# Patient Record
Sex: MALE | Race: WHITE | Employment: FULL TIME | URBAN - METROPOLITAN AREA
[De-identification: names, ages, dates, MRNs, and addresses within clinical notes are randomized per-mention and may not be internally consistent; named-entity substitution may affect disease eponyms.]

---

## 2017-09-20 ENCOUNTER — OFFICE VISIT (OUTPATIENT)
Dept: NEUROLOGY | Age: 57
End: 2017-09-20
Payer: COMMERCIAL

## 2017-09-20 VITALS
WEIGHT: 167.4 LBS | DIASTOLIC BLOOD PRESSURE: 94 MMHG | HEIGHT: 67 IN | BODY MASS INDEX: 26.27 KG/M2 | HEART RATE: 84 BPM | SYSTOLIC BLOOD PRESSURE: 135 MMHG

## 2017-09-20 DIAGNOSIS — G47.33 OBSTRUCTIVE SLEEP APNEA SYNDROME: ICD-10-CM

## 2017-09-20 DIAGNOSIS — R41.3 MEMORY LOSS: Primary | ICD-10-CM

## 2017-09-20 PROCEDURE — 99204 OFFICE O/P NEW MOD 45 MIN: CPT | Performed by: PSYCHIATRY & NEUROLOGY

## 2017-09-20 RX ORDER — SIMVASTATIN 40 MG
1 TABLET ORAL DAILY
COMMUNITY
Start: 2017-08-01

## 2017-09-20 RX ORDER — LISINOPRIL 20 MG/1
1 TABLET ORAL DAILY
COMMUNITY
Start: 2017-07-29

## 2017-09-20 RX ORDER — MODAFINIL 200 MG/1
200 TABLET ORAL DAILY
Qty: 30 TABLET | Refills: 3 | Status: SHIPPED | OUTPATIENT
Start: 2017-09-20 | End: 2018-02-13 | Stop reason: SDUPTHER

## 2017-09-20 RX ORDER — TOLTERODINE 4 MG/1
1 CAPSULE, EXTENDED RELEASE ORAL DAILY
COMMUNITY
Start: 2017-02-02 | End: 2020-10-06

## 2017-09-21 ENCOUNTER — TELEPHONE (OUTPATIENT)
Dept: NEUROLOGY | Age: 57
End: 2017-09-21

## 2017-12-26 ENCOUNTER — HOSPITAL ENCOUNTER (OUTPATIENT)
Dept: MRI IMAGING | Age: 57
Discharge: HOME | End: 2017-12-26
Payer: COMMERCIAL

## 2017-12-26 ENCOUNTER — HOSPITAL ENCOUNTER (OUTPATIENT)
Dept: NEUROLOGY | Age: 57
Discharge: HOME | End: 2017-12-26
Payer: COMMERCIAL

## 2017-12-26 ENCOUNTER — HOSPITAL ENCOUNTER (OUTPATIENT)
Age: 57
Discharge: HOME | End: 2017-12-26
Payer: COMMERCIAL

## 2017-12-26 DIAGNOSIS — R41.3 MEMORY LOSS: ICD-10-CM

## 2017-12-26 LAB
FOLATE: 12.2 NG/ML
TSH SERPL DL<=0.05 MIU/L-ACNC: 1.95 MIU/L (ref 0.3–5)
VITAMIN B-12: 263 PG/ML (ref 232–1245)

## 2017-12-26 PROCEDURE — 70551 MRI BRAIN STEM W/O DYE: CPT

## 2017-12-26 PROCEDURE — 82746 ASSAY OF FOLIC ACID SERUM: CPT

## 2017-12-26 PROCEDURE — 84443 ASSAY THYROID STIM HORMONE: CPT

## 2017-12-26 PROCEDURE — 95816 EEG AWAKE AND DROWSY: CPT

## 2017-12-26 PROCEDURE — 86038 ANTINUCLEAR ANTIBODIES: CPT

## 2017-12-26 PROCEDURE — 82607 VITAMIN B-12: CPT

## 2017-12-26 PROCEDURE — 36415 COLL VENOUS BLD VENIPUNCTURE: CPT

## 2017-12-27 LAB — ANTI-NUCLEAR ANTIBODY (ANA): NEGATIVE

## 2018-02-13 RX ORDER — MODAFINIL 200 MG/1
200 TABLET ORAL DAILY
Qty: 90 TABLET | Refills: 0 | Status: SHIPPED | OUTPATIENT
Start: 2018-02-13 | End: 2018-05-14

## 2018-05-10 ENCOUNTER — OFFICE VISIT (OUTPATIENT)
Dept: NEUROLOGY | Age: 58
End: 2018-05-10
Payer: COMMERCIAL

## 2018-05-10 VITALS
HEIGHT: 67 IN | SYSTOLIC BLOOD PRESSURE: 127 MMHG | HEART RATE: 78 BPM | WEIGHT: 157.8 LBS | BODY MASS INDEX: 24.77 KG/M2 | DIASTOLIC BLOOD PRESSURE: 92 MMHG

## 2018-05-10 DIAGNOSIS — G47.33 OBSTRUCTIVE SLEEP APNEA SYNDROME: Primary | ICD-10-CM

## 2018-05-10 PROCEDURE — 99214 OFFICE O/P EST MOD 30 MIN: CPT | Performed by: PSYCHIATRY & NEUROLOGY

## 2018-05-10 RX ORDER — MODAFINIL 200 MG/1
TABLET ORAL
Qty: 90 TABLET | Refills: 0 | Status: SHIPPED | OUTPATIENT
Start: 2018-05-10 | End: 2018-08-08 | Stop reason: SDUPTHER

## 2018-05-10 RX ORDER — MODAFINIL 200 MG/1
200 TABLET ORAL DAILY
Qty: 30 TABLET | Refills: 0 | Status: SHIPPED | OUTPATIENT
Start: 2018-05-10 | End: 2018-06-09

## 2018-05-10 ASSESSMENT — ENCOUNTER SYMPTOMS
RESPIRATORY NEGATIVE: 1
GASTROINTESTINAL NEGATIVE: 1
EYES NEGATIVE: 1
ALLERGIC/IMMUNOLOGIC NEGATIVE: 1

## 2018-07-27 ENCOUNTER — TELEPHONE (OUTPATIENT)
Dept: NEUROLOGY | Age: 58
End: 2018-07-27

## 2018-07-31 NOTE — TELEPHONE ENCOUNTER
I called and spoke with Mrs. Berman. I was not in office Friday or yesterday. She said he doesn't think he is having difficulties but she doesn't like the fact he isn't sleeping well and he has had weight loss, but he does have recurrence of prostate cancer. I lurdes dher would be best for Dr Wisam Card to see him. She also added he is doing some\"funny things\" with his mouth. Put in for an appt on 8/8/18 at 8:20. She will discuss with him.

## 2018-08-07 DIAGNOSIS — G47.33 OBSTRUCTIVE SLEEP APNEA SYNDROME: Primary | ICD-10-CM

## 2018-08-07 RX ORDER — MODAFINIL 200 MG/1
TABLET ORAL
Qty: 30 TABLET | Refills: 0 | Status: CANCELLED | OUTPATIENT
Start: 2018-08-07 | End: 2018-11-07

## 2018-08-07 NOTE — TELEPHONE ENCOUNTER
Dylan Lambert called in today. He said his wife was \"cleaning out medications\" and threw out his modafanil. He doesn't have any. He would like a 30 day supply to [facility] in [place] and a 90d supply to his [facility]. He was here in [facility]. The last contact was from his wife wo said that he had recurrence of prostate cancer and 20 lb. weight loss. She felt the Modafanil was effecting hiim so she booked him an appt for 8/8/18. He would like to cancel this. He doesn't feel the Tanvir Michi is a problem and his wife said that in the call on Friday. Please advise.

## 2018-08-07 NOTE — TELEPHONE ENCOUNTER
Discussed with Dr. Prasanna Shafer. See other telephone encounter for today from Glory Alvarez. He wants to see him but we can give a 30d supply. I spoke with MR. Berman. He will keep tomorrows appt but needs refill tonight if possible at [facility] in [place].

## 2018-08-08 ENCOUNTER — OFFICE VISIT (OUTPATIENT)
Dept: NEUROLOGY | Age: 58
End: 2018-08-08
Payer: COMMERCIAL

## 2018-08-08 VITALS
HEIGHT: 67 IN | DIASTOLIC BLOOD PRESSURE: 87 MMHG | HEART RATE: 54 BPM | SYSTOLIC BLOOD PRESSURE: 132 MMHG | WEIGHT: 149.2 LBS | BODY MASS INDEX: 23.42 KG/M2

## 2018-08-08 DIAGNOSIS — G47.33 OBSTRUCTIVE SLEEP APNEA SYNDROME: Primary | ICD-10-CM

## 2018-08-08 DIAGNOSIS — R41.3 MEMORY LOSS: ICD-10-CM

## 2018-08-08 PROCEDURE — 99214 OFFICE O/P EST MOD 30 MIN: CPT | Performed by: PSYCHIATRY & NEUROLOGY

## 2018-08-08 RX ORDER — MODAFINIL 200 MG/1
TABLET ORAL
Qty: 14 TABLET | Refills: 0 | Status: SHIPPED | OUTPATIENT
Start: 2018-08-08 | End: 2018-08-22

## 2018-08-08 RX ORDER — MODAFINIL 200 MG/1
TABLET ORAL
Qty: 90 TABLET | Refills: 0 | Status: SHIPPED | OUTPATIENT
Start: 2018-08-08 | End: 2018-10-08 | Stop reason: SDUPTHER

## 2018-08-08 ASSESSMENT — ENCOUNTER SYMPTOMS
RESPIRATORY NEGATIVE: 1
EYES NEGATIVE: 1
ALLERGIC/IMMUNOLOGIC NEGATIVE: 1
GASTROINTESTINAL NEGATIVE: 1

## 2018-08-08 NOTE — PROGRESS NOTES
Subjective:      Patient ID: Kaushal Hare is a 59 y.o. male. HPI    Active problem memory loss and sleep apnea unable to tolerate nasal CPAP having had UPPP with tongue reduction through radiofrequency trying to get approval for hypoglossal nerve stimulator  . Feel memory and cognitive complaints are attributed to sleep apnea with nonrestorative sleep and excessive daytime sleepiness on provigil . The condition is he has had approval to undergo hypoglossal nerve stimulator to have this surgery in November at  of   . He has prostate cancer having had prostatectomy and irradiation in 2015 and 2016 . He has had gallium scan that shows enlarged hip area lymph nodes on both sides which they think is from prostate cancer to undergo irradiation to this area . He has lost weight weighing 149 lbs with last weight being 157 lbs in May with concern this maybe from provigil  . He is going to bed 10 PM to 12 AM falling asleep within a few minutes . He will sleep to 5:30 AM to 6 AM . There will be frequent arousals every hour with gasping and frequent apnea. Snoring has been attenuated with UPPP to wheezing like noise. He will take provigil in morning from 6 to 7 AM staying more focused being awake . This will wear off by about 5 to 6 PM with fatigue . Memory is good on provigil able to focus being more functional . Significant medications provigil 200 mg po qAM . Testing B12 263, LIV negative, TSH normal , folic acid 50.0 .  MRI of Head normal ., EEG normal      Past Medical History:   Diagnosis Date    ADD (attention deficit disorder)     Anemia     Caffeine use     Does not use    Depression     ED (erectile dysfunction)     Hyperlipidemia     Hypertension     Mitral valve disease     [facility] [address]     Sleep apnea        Past Surgical History:   Procedure Laterality Date    APPENDECTOMY      BICEPS TENDON REPAIR      KNEE SURGERY      OTHER SURGICAL HISTORY      Semnoplasty    PROSTATECTOMY     

## 2018-08-08 NOTE — LETTER
Frequent urination       Review of Systems   Constitutional: Positive for appetite change and unexpected weight change. HENT: Negative. Eyes: Negative. Respiratory: Negative. Cardiovascular: Negative. Gastrointestinal: Negative. Genitourinary: Positive for frequency and urgency. Musculoskeletal: Negative. Skin: Negative. Allergic/Immunologic: Negative. Neurological: Negative. Psychiatric/Behavioral: Negative. Objective:   Physical Exam    Vitals:    08/08/18 0836   BP: 132/87   Pulse: 54     Constitutional .General appearance in no acute distress    Ears/Nose/Throat. Normal soft palate and uvula. Base of tongue normal          Neck normal  Respiratory . Breath sounds clear bilaterally  Cardiovascular. Regular rate and rhythm and normal heart sounds  Muskuloskeletal.Normal tone. Muscle strength grossly normal nonfocal.   Gait and station normal  Orientation and mood. Alert and oriented. WORLD able to spell forwards and back wards . [address] . Short term memory 3 words out of 3 in one minute   Attention and concentration normal   Language and speech. Normal quality with no aphasia  Cranial neve 2. Fields intact to confrontation  Cranial nerve 3,4 and 6. Extraocular movements intact. Pupils equal round and reactive to light  Cranial Nerve 7. Normal exam  Sensation . Intact to pin prick  Deep tendon reflexes intact     Assessment:      1. Obstructive sleep apnea syndrome    2. Memory loss    Will keep watch of his weight keeping on provigil to proceed with hypoglossal nerve stimulator      Plan:      As above           If you have questions, please do not hesitate to call me. I look forward to following Jean Marshall along with you.     Sincerely,        Marily Reyna MD     providers:  Les Watkins MD  [address]: [phone]

## 2018-08-09 NOTE — COMMUNICATION BODY
Subjective:      Patient ID: Grafton Shone is a 59 y.o. male. HPI    Active problem memory loss and sleep apnea unable to tolerate nasal CPAP having had UPPP with tongue reduction through radiofrequency trying to get approval for hypoglossal nerve stimulator  . Feel memory and cognitive complaints are attributed to sleep apnea with nonrestorative sleep and excessive daytime sleepiness on provigil . The condition is he has had approval to undergo hypoglossal nerve stimulator [facility] He has prostate cancer having had prostatectomy and irradiation in 2015 and 2016 . He has had gallium scan that shows enlarged hip area lymph nodes on both sides which they think is from prostate cancer to undergo irradiation to this area . He has lost weight weighing 149 lbs with last weight being 157 lbs in May with concern this maybe from provigil  . He is going to bed 10 PM to 12 AM falling asleep within a few minutes . He will sleep to 5:30 AM to 6 AM . There will be frequent arousals every hour with gasping and frequent apnea. Snoring has been attenuated with UPPP to wheezing like noise. He will take provigil in morning from 6 to 7 AM staying more focused being awake . This will wear off by about 5 to 6 PM with fatigue . Memory is good on provigil able to focus being more functional . Significant medications provigil 200 mg po qAM . Testing B12 263, LIV negative, TSH normal , folic acid 10.7 .  MRI of Head normal ., EEG normal      Past Medical History:   Diagnosis Date    ADD (attention deficit disorder)     Anemia     Caffeine use     Does not use    Depression     ED (erectile dysfunction)     Hyperlipidemia     Hypertension     Mitral valve disease     Pro[facility] ([facility] [address]     Sleep apnea        Past Surgical History:   Procedure Laterality Date    APPENDECTOMY      BICEPS TENDON REPAIR      KNEE SURGERY      OTHER SURGICAL HISTORY      Semnoplasty    PROSTATECTOMY      Exam    Vitals:    08/08/18 0836   BP: 132/87   Pulse: 54     Constitutional .General appearance in no acute distress    Ears/Nose/Throat. Normal soft palate and uvula. Base of tongue normal          Neck normal  Respiratory . Breath sounds clear bilaterally  Cardiovascular. Regular rate and rhythm and normal heart sounds  Muskuloskeletal.Normal tone. Muscle strength grossly normal nonfocal.   Gait and station normal  Orientation and mood. Alert and oriented. WORLD able to spell forwards and back wards . Serila 7 . Short term memory 3 words out of 3 in one minute   Attention and concentration normal   Language and speech. Normal quality with no aphasia  Cranial neve 2. Fields intact to confrontation  Cranial nerve 3,4 and 6. Extraocular movements intact. Pupils equal round and reactive to light  Cranial Nerve 7. Normal exam  Sensation . Intact to pin prick  Deep tendon reflexes intact     Assessment:      1. Obstructive sleep apnea syndrome    2.  Memory loss    Will keep watch of his weight keeping on provigil to proceed with hypoglossal nerve stimulator      Plan:      As above

## 2018-10-08 ENCOUNTER — OFFICE VISIT (OUTPATIENT)
Dept: NEUROLOGY | Age: 58
End: 2018-10-08
Payer: COMMERCIAL

## 2018-10-08 VITALS
WEIGHT: 152 LBS | BODY MASS INDEX: 23.86 KG/M2 | HEIGHT: 67 IN | DIASTOLIC BLOOD PRESSURE: 94 MMHG | HEART RATE: 67 BPM | SYSTOLIC BLOOD PRESSURE: 135 MMHG

## 2018-10-08 DIAGNOSIS — G47.33 OBSTRUCTIVE SLEEP APNEA SYNDROME: ICD-10-CM

## 2018-10-08 PROCEDURE — 99214 OFFICE O/P EST MOD 30 MIN: CPT | Performed by: PSYCHIATRY & NEUROLOGY

## 2018-10-08 RX ORDER — MODAFINIL 200 MG/1
TABLET ORAL
Qty: 30 TABLET | Refills: 0 | Status: SHIPPED | OUTPATIENT
Start: 2018-10-08 | End: 2019-11-08

## 2018-10-08 ASSESSMENT — ENCOUNTER SYMPTOMS
GASTROINTESTINAL NEGATIVE: 1
RESPIRATORY NEGATIVE: 1
ALLERGIC/IMMUNOLOGIC NEGATIVE: 1
EYES NEGATIVE: 1

## 2018-10-08 NOTE — LETTER
 ED (erectile dysfunction)     Hyperlipidemia     Hypertension     Mitral valve disease     Prostate cancer ([facility] [address]     Sleep apnea        Past Surgical History:   Procedure Laterality Date    APPENDECTOMY      BICEPS TENDON REPAIR      KNEE SURGERY      OTHER SURGICAL HISTORY      Semnoplasty    PROSTATECTOMY      TONSILLECTOMY AND ADENOIDECTOMY      UVULOPALATOPHARYGOPLASTY         History reviewed. No pertinent family history. Social History     Social History    Marital status:      Spouse name: Silas Davis    Number of children: 2    Years of education: N/A     Occupational History    Technician @ [employer]      Social History Main Topics    Smoking status: Never Smoker    Smokeless tobacco: Never Used    Alcohol use Yes      Comment: occ    Drug use: No    Sexual activity: Yes     Other Topics Concern    None     Social History Narrative    None       Current Outpatient Prescriptions   Medication Sig Dispense Refill    modafinil (PROVIGIL) 200 MG tablet Take 1 po qd. 30 tablet 0    lisinopril (PRINIVIL;ZESTRIL) 20 MG tablet Take 1 tablet by mouth daily      simvastatin (ZOCOR) 40 MG tablet Take 1 tablet by mouth daily      tolterodine (DETROL LA) 4 MG extended release capsule Take 1 capsule by mouth daily      Cyanocobalamin (VITAMIN B-12 IJ) Inject 1,000 mcg into the skin      Multiple Vitamins-Minerals (THERAPEUTIC MULTIVITAMIN-MINERALS) tablet Take 1 tablet by mouth daily      sildenafil (VIAGRA) 100 MG tablet Take 100 mg by mouth as needed        No current facility-administered medications for this visit. Allergies   Allergen Reactions    Atomoxetine Other (See Comments)     Frequent urination    Strattera [Atomoxetine Hcl] Other (See Comments)     Frequent urination       Review of Systems   Constitutional: Positive for unexpected weight change. HENT: Negative. Eyes: Negative. Respiratory: Negative. Cardiovascular: Negative.

## 2018-10-09 NOTE — COMMUNICATION BODY
Neck normal  Respiratory . Breath sounds clear bilaterally  Cardiovascular. Regular rate and rhythm and normal heart sounds  Muskuloskeletal.Normal tone. Muscle strength grossly normal nonfocal.   Gait and station normal  Orientation and mood. Alert and oriented. WORLD able to spell forwards and back wards . Serila 7 . Short term memory 3 words out of 3 in one minute   Attention and concentration normal   Language and speech. Normal quality with no aphasia  Cranial neve 2. Fields intact to confrontation  Cranial nerve 3,4 and 6. Extraocular movements intact. Pupils equal round and reactive to light  Cranial Nerve 7. Normal exam  Sensation . Intact to pin prick  Deep tendon reflexes intact     Assessment:      1.  Obstructive sleep apnea syndrome    Will dispense provigil only locally for mailorder is inconsistent to proceed with hypoglossal nerve stimulator      Plan:      As above

## 2018-11-17 ENCOUNTER — HOSPITAL ENCOUNTER (OUTPATIENT)
Age: 58
Discharge: HOME | End: 2018-11-17
Payer: COMMERCIAL

## 2018-11-17 PROCEDURE — 87081 CULTURE SCREEN ONLY: CPT

## 2018-11-17 PROCEDURE — 87641 MR-STAPH DNA AMP PROBE: CPT

## 2018-11-18 LAB
CULTURE: NORMAL
Lab: NORMAL
MRSA, DNA, NASAL: NORMAL
SPECIMEN DESCRIPTION: NORMAL
SPECIMEN DESCRIPTION: NORMAL
STATUS: NORMAL

## 2020-10-06 ENCOUNTER — HOSPITAL ENCOUNTER (OUTPATIENT)
Dept: PREADMISSION TESTING | Age: 60
Discharge: HOME | End: 2020-10-10
Payer: COMMERCIAL

## 2020-10-06 VITALS
DIASTOLIC BLOOD PRESSURE: 93 MMHG | RESPIRATION RATE: 16 BRPM | SYSTOLIC BLOOD PRESSURE: 140 MMHG | TEMPERATURE: 97.5 F | BODY MASS INDEX: 25.19 KG/M2 | WEIGHT: 160.5 LBS | OXYGEN SATURATION: 98 % | HEART RATE: 96 BPM | HEIGHT: 67 IN

## 2020-10-06 LAB
ABSOLUTE EOS #: 0.09 K/UL (ref 0–0.44)
ABSOLUTE IMMATURE GRANULOCYTE: 0.06 K/UL (ref 0–0.3)
ABSOLUTE LYMPH #: 0.84 K/UL (ref 1.1–3.7)
ABSOLUTE MONO #: 0.56 K/UL (ref 0.1–1.2)
ANION GAP SERPL CALCULATED.3IONS-SCNC: 10 MMOL/L (ref 9–17)
BASOPHILS # BLD: 1 % (ref 0–2)
BASOPHILS ABSOLUTE: 0.06 K/UL (ref 0–0.2)
BUN BLDV-MCNC: 18 MG/DL (ref 8–23)
CHLORIDE BLD-SCNC: 102 MMOL/L (ref 98–107)
CO2: 25 MMOL/L (ref 20–31)
CREAT SERPL-MCNC: 0.73 MG/DL (ref 0.7–1.2)
DIFFERENTIAL TYPE: ABNORMAL
EOSINOPHILS RELATIVE PERCENT: 1 % (ref 1–4)
GFR AFRICAN AMERICAN: >60 ML/MIN
GFR NON-AFRICAN AMERICAN: >60 ML/MIN
GFR SERPL CREATININE-BSD FRML MDRD: NORMAL ML/MIN/{1.73_M2}
GFR SERPL CREATININE-BSD FRML MDRD: NORMAL ML/MIN/{1.73_M2}
HCT VFR BLD CALC: 43 % (ref 40.7–50.3)
HEMOGLOBIN: 14.7 G/DL (ref 13–17)
IMMATURE GRANULOCYTES: 1 %
LYMPHOCYTES # BLD: 12 % (ref 24–43)
MCH RBC QN AUTO: 33.5 PG (ref 25.2–33.5)
MCHC RBC AUTO-ENTMCNC: 34.2 G/DL (ref 28.4–34.8)
MCV RBC AUTO: 97.9 FL (ref 82.6–102.9)
MONOCYTES # BLD: 8 % (ref 3–12)
NRBC AUTOMATED: 0 PER 100 WBC
PDW BLD-RTO: 11.8 % (ref 11.8–14.4)
PLATELET # BLD: 231 K/UL (ref 138–453)
PLATELET ESTIMATE: ABNORMAL
PMV BLD AUTO: 9 FL (ref 8.1–13.5)
POTASSIUM SERPL-SCNC: 4.1 MMOL/L (ref 3.7–5.3)
RBC # BLD: 4.39 M/UL (ref 4.21–5.77)
RBC # BLD: ABNORMAL 10*6/UL
SEG NEUTROPHILS: 77 % (ref 36–65)
SEGMENTED NEUTROPHILS ABSOLUTE COUNT: 5.16 K/UL (ref 1.5–8.1)
SODIUM BLD-SCNC: 137 MMOL/L (ref 135–144)
WBC # BLD: 6.8 K/UL (ref 3.5–11.3)
WBC # BLD: ABNORMAL 10*3/UL

## 2020-10-06 PROCEDURE — 36415 COLL VENOUS BLD VENIPUNCTURE: CPT

## 2020-10-06 PROCEDURE — 82565 ASSAY OF CREATININE: CPT

## 2020-10-06 PROCEDURE — 85025 COMPLETE CBC W/AUTO DIFF WBC: CPT

## 2020-10-06 PROCEDURE — 84520 ASSAY OF UREA NITROGEN: CPT

## 2020-10-06 PROCEDURE — 80051 ELECTROLYTE PANEL: CPT

## 2020-10-06 PROCEDURE — 93005 ELECTROCARDIOGRAM TRACING: CPT | Performed by: ANESTHESIOLOGY

## 2020-10-06 RX ORDER — BUPROPION HYDROCHLORIDE 100 MG/1
200 TABLET ORAL 2 TIMES DAILY
COMMUNITY

## 2020-10-06 RX ORDER — OMEPRAZOLE 20 MG/1
40 CAPSULE, DELAYED RELEASE ORAL DAILY
COMMUNITY

## 2020-10-06 RX ORDER — PREGABALIN 100 MG/1
100 CAPSULE ORAL 2 TIMES DAILY
COMMUNITY

## 2020-10-06 RX ORDER — BUPROPION HYDROCHLORIDE 100 MG/1
100 TABLET, EXTENDED RELEASE ORAL 2 TIMES DAILY
COMMUNITY
End: 2020-10-06

## 2020-10-06 NOTE — H&P
History and Physical Service   [facility]    HISTORY AND PHYSICAL EXAMINATION            Date of Evaluation: 10/6/2020  Patient name:  Thomas Young  MRN:   [id number]  YOB: 1960  PCP:    Taty Hanna    History Obtained From:     Patient, Medical records    History of Present Illness: This is Thomas Young a 56 y.o. male who presents for a pre-admission testing appointment for an upcoming right foot Lapidus bunionectomy by Dr. Jax Sosa scheduled on 10/20/2020 at 0730 due to right foot hallux valgus. The patient's chief complaint is 6-7/10 right foot pain while wearing certain shoes. Pt denies taking pain medication for the foot pain. Pt denies right foot edema, numbness, and tingling. History of mitral valve disease, hyperlipidemia, hypertension, sleep apnea, and difficult intubation (Pt states a glidescope has been used for intubation in the past). Pt states he had difficulty breathing when he was extubated after knee arthroscopy. S/p hypoglossal nerve stimulator placement in 2018 and revision in 06/2020. He had difficulty breathing after the revision and was admitted overnight for monitoring. The revision did not improve the function of the stimulator and it was turned off. Dr. Nkechi Yarbrough from the [address] [place] [address],[address] put in the \"[employer]\" hypoglossal nerve stimulator. His next appointment with Dr. Nkechi Yarbrough is on 10/28/2020. Discussed the pt's medical history with Dr. Sydney Amezcua. Dr. Sydney Amezcua stated the pt does not need to be evaluated by anesthesia during the PAT appointment. The pt will be evaluated by anesthesia the morning of surgery per Dr. Sydney Amezcua. Functional Capacity per pt:   1) Pt is able to walk 2 city blocks on level ground without SOB. 2) Pt is able to climb 2 flights of stairs without SOB. 3) Pt is able to walk up a hill for 1-2 city blocks without SOB.     Past Medical History:     Past Medical History:   Diagnosis Date    ADD (attention deficit disorder)     Anemia     Anesthesia complication Prior to 2928    The pt had anxiety and trouble breathing when extubated after knee arthroscopy.  Caffeine use     Does not use    Depression     ED (erectile dysfunction)     History of difficult intubation     Pt states a glidescope was needed in the past for intubation.  Hyperlipidemia     Hypertension     Mitral valve disease     PONV (postoperative nausea and vomiting)     Prostate cancer ([facility] [address]     Radiation cystitis     Sleep apnea     semnoplasty        Past Surgical History:     Past Surgical History:   Procedure Laterality Date    APPENDECTOMY      BICEPS TENDON REPAIR      BLADDER SURGERY      stricture,biopsy,cauterized    JOINT REPLACEMENT Bilateral     total joint    KNEE SURGERY      OTHER SURGICAL HISTORY  12/05/2018    hypoglossal nerve stimulator insertion     OTHER SURGICAL HISTORY  06/12/2020    revision of hypoglossal nerve stimulator    PROSTATECTOMY      TONSILLECTOMY AND ADENOIDECTOMY      UVULOPALATOPHARYGOPLASTY  2004 or 2005    with somnoplasty        Medications Prior to Admission:     Prior to Admission medications    Medication Sig Start Date End Date Taking? Authorizing Provider   pregabalin (LYRICA) 100 MG capsule Take 100 mg by mouth 2 times daily.    Yes Historical Provider, MD   omeprazole (PRILOSEC) 20 MG delayed release capsule Take 40 mg by mouth daily   Yes Historical Provider, MD   mirabegron (MYRBETRIQ) 25 MG TB24 Take 25 mg by mouth 2 times daily   Yes Historical Provider, MD   buPROPion (WELLBUTRIN) 100 MG tablet Take 200 mg by mouth 2 times daily   Yes Historical Provider, MD   lisinopril (PRINIVIL;ZESTRIL) 20 MG tablet Take 1 tablet by mouth daily 7/29/17   Historical Provider, MD   simvastatin (ZOCOR) 40 MG tablet Take 1 tablet by mouth daily 8/1/17   Historical Provider, MD   Cyanocobalamin (VITAMIN B-12 IJ) Inject 1,000 mcg into the skin daily     Historical Provider, MD   Multiple lightheadedness. Pt denies history of a stroke, seizure, and head injury. BEHAVIOR/PSYCH: History of ADD. Negative for depression and anxiety. Physical Exam:   BP (!) 140/93   Pulse 96   Temp 97.5 °F (36.4 °C) (Temporal)   Resp 16   Ht 5' 7\" (1.702 m)   Wt 160 lb 7.9 oz (72.8 kg)   SpO2 98%   BMI 25.14 kg/m²  No results for input(s): POCGLU in the last 72 hours. General Appearance:  Alert, well appearing, and in no acute distress. Mental status: Oriented to person, place, and time. Head: Normocephalic and atraumatic. Eye: No icterus, redness, pupils equal and reactive, extraocular eye movements intact, and conjunctiva clear. Ear: Hearing grossly intact. Nose: No drainage noted. Mouth: Mucous membranes moist.  Neck: Supple and no carotid bruits noted. Lungs: Bilateral equal air entry, clear to auscultation, no wheezing, rales or rhonchi, and normal effort. Cardiovascular: Hypoglossal nerve stimulator generator in the right upper chest. Normal rate, regular rhythm, no murmur, gallop, or rub. Abdomen: Soft, non-tender, non-distended, and active bowel sounds. Neurologic: Normal speech and cranial nerves II through XII grossly intact. Strength 5/5 bilaterally. Skin: No gross lesions, rashes, bruising, or bleeding on exposed skin area. Extremities: Posterior tibial pulses 2+ bilaterally. No ankle edema. No calf tenderness with palpation. Psych: Normal affect.      Investigations:      Laboratory Testing:  Recent Results (from the past 24 hour(s))   CBC Auto Differential    Collection Time: 10/06/20  3:51 PM   Result Value Ref Range    WBC 6.8 3.5 - 11.3 k/uL    RBC 4.39 4.21 - 5.77 m/uL    Hemoglobin 14.7 13.0 - 17.0 g/dL    Hematocrit 43.0 40.7 - 50.3 %    MCV 97.9 82.6 - 102.9 fL    MCH 33.5 25.2 - 33.5 pg    MCHC 34.2 28.4 - 34.8 g/dL    RDW 11.8 11.8 - 14.4 %    Platelets 939 867 - 728 k/uL    MPV 9.0 8.1 - 13.5 fL    NRBC Automated 0.0 0.0 per 100 WBC    Differential Type NOT REPORTED Seg Neutrophils 77 (H) 36 - 65 %    Lymphocytes 12 (L) 24 - 43 %    Monocytes 8 3 - 12 %    Eosinophils % 1 1 - 4 %    Basophils 1 0 - 2 %    Immature Granulocytes 1 (H) 0 %    Segs Absolute 5.16 1.50 - 8.10 k/uL    Absolute Lymph # 0.84 (L) 1.10 - 3.70 k/uL    Absolute Mono # 0.56 0.10 - 1.20 k/uL    Absolute Eos # 0.09 0.00 - 0.44 k/uL    Basophils Absolute 0.06 0.00 - 0.20 k/uL    Absolute Immature Granulocyte 0.06 0.00 - 0.30 k/uL    WBC Morphology NOT REPORTED     RBC Morphology NOT REPORTED     Platelet Estimate NOT REPORTED    BUN & Creatinine    Collection Time: 10/06/20  3:51 PM   Result Value Ref Range    BUN 18 8 - 23 mg/dL    CREATININE 0.73 0.70 - 1.20 mg/dL    GFR Non-African American >60 >60 mL/min    GFR African American >60 >60 mL/min    GFR Comment          GFR Staging NOT REPORTED    Electrolyte Panel    Collection Time: 10/06/20  3:51 PM   Result Value Ref Range    Sodium 137 135 - 144 mmol/L    Potassium 4.1 3.7 - 5.3 mmol/L    Chloride 102 98 - 107 mmol/L    CO2 25 20 - 31 mmol/L    Anion Gap 10 9 - 17 mmol/L       Recent Labs     10/06/20  1551   HGB 14.7   HCT 43.0   WBC 6.8   MCV 97.9      K 4.1      CO2 25   BUN 18   CREATININE 0.73       No results for input(s): COVID19 in the last 720 hours. EKG: 10/06/2020. See Epic. Diagnosis:      1. Right foot hallux valgus    Plans:     1.  Right foot Lapidus bunionectomy      Glenis Fraser, MIKAYLA - CNP  10/6/2020  5:11 PM

## 2020-10-06 NOTE — PRE-PROCEDURE INSTRUCTIONS
[facility] ON Tuesday, October 20th at 06:00 AM    covid screening 10/16/2020 at 10:20am    Day of surgery call [phone] and they will instruct on what to do      Continue to take your home medications as you normally do up to and including the night before surgery with the exception of any blood thinning medications. Please stop any blood thinning medications as directed by your surgeon or prescribing physician. Failure to stop certain medications may interfere with your scheduled surgery. These may include:  Aspirin, Warfarin (Coumadin), Clopidogrel (Plavix), Ibuprofen (Motrin, Advil), Naproxen (Aleve), Meloxicam (Mobic), Celecoxib (Celebrex), Eliquis, Pradaxa, Xarelto, Effient, Fish Oil, Herbal supplements. Stop multivitamin one week prior to surgery, tylenol is okay to take    If you are diabetic, do not take any of your diabetic medications by mouth the morning of surgery. If you are taking insulin contact the doctor that manages your diabetes for instructions about any changes to your insulin dosages the day before surgery. Do not inject insulin or other injectable diabetic medications the morning of surgery unless otherwise instructed by the doctor who manages your diabetes. Please take the following medication(s) the day of surgery with a small sip of water:  Lyrica,wellbutrin, and omeprazole  Please use your inhalers at home the day of surgery. PREPARING FOR YOUR SURGERY:     Before surgery, you can play an important role in your own health. Because skin is not sterile, we need to be sure that your skin is as free of germs as possible before surgery by carefully washing before surgery. Preparing or prepping skin before surgery can reduce the risk of a surgical site infection.   Do not shave the area of your body where your surgery will be performed unless you received specific permission from your physician.     You will need to shower at home the night before surgery and the morning of surgery with a special soap called chlorhexidine gluconate (CHG*). *Not to be used by people allergic to Chlorhexidine Gluconate (CHG). Following these instructions will help you be sure that your skin is clean before surgery. Instructions on cleaning your skin before surgery: The night before your surgery:      You will need to shower with warm water (not hot) and the CHG soap.  Use a clean wash cloth and a clean towel. Have clean clothes available to put on after the shower.    First wash your hair with regular shampoo. Rinse your hair and body thoroughly to remove the shampoo. Bonilla Anne Wash your face with your regular soap or water only. Thoroughly rinse your body with warm water from the neck down.  Turn water off to prevent rinsing the soap off too soon.  With a clean wet washcloth and half of the CHG soap in the bottle, lather your entire body from the neck down. Do not use CHG soap near your eyes or ears to avoid injury to those areas.  Wash thoroughly, paying special attention to the area where your surgery will be performed.  Wash your body gently for five (5) minutes. Avoid scrubbing your skin too hard.  Turn the water back on and rinse your body thoroughly.  Pat yourself dry with a clean, soft towel. Do not apply lotion, cream or powder.  Dress with clean freshly washed clothes. The morning of surgery:     Repeat shower following steps above - using remaining half of CHG soap in bottle. Patient Instructions:    Bonilla Anne If you are having any type of anesthesia you are to have nothing to eat or drink after midnight the night before your surgery. This includes gum, mints, water or smoking or chewing tobacco.  The only exception to this is a small sip of water to take with any morning dose of heart, blood pressure, or seizure medications. No alcoholic beverages for 24 hours prior to surgery.      Bring your eyeglasses and case with you. No contacts are to be worn the day of surgery. You also may bring your hearing aids. Most surgical procedures involving anesthesia will require that you remove your dentures prior to surgery.  If you are on C-PAP or Bi-PAP at home and plan on staying in the hospital overnight for your surgery please bring the machine with you. · Do not wear any jewelry or body piercings day of surgery. Also, NO lotion, perfume or deodorant to be used the day of surgery. No nail polish on the operative extremity (arm/leg surgeries)    ·   If you are staying overnight with us, please bring a small bag of personal items.  Please wear loose, comfortable clothing. If you are potentially going to have a cast or brace bring clothing that will fit over them.  In case of illness - If you have cold or flu like symptoms (high fever, runny nose, sore throat, cough, etc.) rash, nausea, vomiting, loose stools, and/or recent contact with someone who has a contagious disease (chicken pox, measles, etc.) Please call your doctor before coming to the hospital.     If your child is having surgery please make arrangements for any other children to be cared for at home on the day of surgery. Other children are not permitted in recovery room and we want you to be able to spend time with the patient. If other arrangements are not available then we suggest that you have a second adult to stay in the waiting room. Day of Surgery/Procedure:    As a patient at [facility] you can expect quality medical and nursing care that is centered on your individual needs. Our goal is to make your surgical experience as comfortable as possible    . Transportation After Your Surgery/Procedure: You will need a friend or family member to drive you home after your procedure.   Your  must be 18 years of age or older and able to sign off on your discharge instructions. A taxi cab or any other form of public transportation is not acceptable. Your friend or family member must stay at the hospital throughout your procedure. Someone must remain with you for the first 24 hours after your surgery if you receive anesthesia or medication. If you do not have someone to stay with you, your procedure may be cancelled.       If you have any other questions regarding your procedure or the day of surgery, please call [phone]      _________________________  ____________________________  Signature (Patient)              Signature (Provider) & date

## 2020-10-08 LAB
EKG ATRIAL RATE: 83 BPM
EKG P AXIS: 59 DEGREES
EKG P-R INTERVAL: 146 MS
EKG Q-T INTERVAL: 378 MS
EKG QRS DURATION: 106 MS
EKG QTC CALCULATION (BAZETT): 444 MS
EKG R AXIS: 64 DEGREES
EKG T AXIS: 6 DEGREES
EKG VENTRICULAR RATE: 83 BPM

## 2020-10-08 PROCEDURE — 93010 ELECTROCARDIOGRAM REPORT: CPT | Performed by: INTERNAL MEDICINE

## 2020-10-15 ENCOUNTER — HOSPITAL ENCOUNTER (OUTPATIENT)
Dept: PREADMISSION TESTING | Age: 60
Setting detail: SPECIMEN
Discharge: HOME | End: 2020-10-19
Payer: COMMERCIAL

## 2020-10-15 PROCEDURE — U0003 INFECTIOUS AGENT DETECTION BY NUCLEIC ACID (DNA OR RNA); SEVERE ACUTE RESPIRATORY SYNDROME CORONAVIRUS 2 (SARS-COV-2) (CORONAVIRUS DISEASE [COVID-19]), AMPLIFIED PROBE TECHNIQUE, MAKING USE OF HIGH THROUGHPUT TECHNOLOGIES AS DESCRIBED BY CMS-2020-01-R: HCPCS

## 2020-10-16 LAB — SARS-COV-2, NAA: NOT DETECTED

## 2020-10-17 ENCOUNTER — TELEPHONE (OUTPATIENT)
Dept: PRIMARY CARE CLINIC | Age: 60
End: 2020-10-17

## 2020-10-19 ENCOUNTER — ANESTHESIA EVENT (OUTPATIENT)
Dept: OPERATING ROOM | Age: 60
End: 2020-10-19
Payer: COMMERCIAL

## 2020-10-20 ENCOUNTER — APPOINTMENT (OUTPATIENT)
Dept: GENERAL RADIOLOGY | Age: 60
End: 2020-10-20
Attending: PODIATRIST
Payer: COMMERCIAL

## 2020-10-20 ENCOUNTER — ANESTHESIA (OUTPATIENT)
Dept: OPERATING ROOM | Age: 60
End: 2020-10-20
Payer: COMMERCIAL

## 2020-10-20 ENCOUNTER — HOSPITAL ENCOUNTER (OUTPATIENT)
Age: 60
Setting detail: OUTPATIENT SURGERY
Discharge: HOME | End: 2020-10-20
Attending: PODIATRIST | Admitting: PODIATRIST
Payer: COMMERCIAL

## 2020-10-20 VITALS
BODY MASS INDEX: 25.11 KG/M2 | RESPIRATION RATE: 22 BRPM | WEIGHT: 160 LBS | SYSTOLIC BLOOD PRESSURE: 121 MMHG | HEART RATE: 74 BPM | TEMPERATURE: 96.8 F | DIASTOLIC BLOOD PRESSURE: 87 MMHG | HEIGHT: 67 IN | OXYGEN SATURATION: 98 %

## 2020-10-20 VITALS — TEMPERATURE: 58.5 F | DIASTOLIC BLOOD PRESSURE: 79 MMHG | OXYGEN SATURATION: 100 % | SYSTOLIC BLOOD PRESSURE: 122 MMHG

## 2020-10-20 PROCEDURE — C1769 GUIDE WIRE: HCPCS | Performed by: PODIATRIST

## 2020-10-20 PROCEDURE — 2720000010 HC SURG SUPPLY STERILE: Performed by: PODIATRIST

## 2020-10-20 PROCEDURE — 73620 X-RAY EXAM OF FOOT: CPT

## 2020-10-20 PROCEDURE — 7100000011 HC PHASE II RECOVERY - ADDTL 15 MIN: Performed by: PODIATRIST

## 2020-10-20 PROCEDURE — 6360000002 HC RX W HCPCS: Performed by: NURSE ANESTHETIST, CERTIFIED REGISTERED

## 2020-10-20 PROCEDURE — 2500000003 HC RX 250 WO HCPCS: Performed by: NURSE ANESTHETIST, CERTIFIED REGISTERED

## 2020-10-20 PROCEDURE — 3700000001 HC ADD 15 MINUTES (ANESTHESIA): Performed by: PODIATRIST

## 2020-10-20 PROCEDURE — 3209999900 FLUORO FOR SURGICAL PROCEDURES

## 2020-10-20 PROCEDURE — 7100000001 HC PACU RECOVERY - ADDTL 15 MIN: Performed by: PODIATRIST

## 2020-10-20 PROCEDURE — 6360000002 HC RX W HCPCS: Performed by: PODIATRIST

## 2020-10-20 PROCEDURE — 2580000003 HC RX 258: Performed by: ANESTHESIOLOGY

## 2020-10-20 PROCEDURE — 3600000003 HC SURGERY LEVEL 3 BASE: Performed by: PODIATRIST

## 2020-10-20 PROCEDURE — 2500000003 HC RX 250 WO HCPCS: Performed by: PODIATRIST

## 2020-10-20 PROCEDURE — 3600000013 HC SURGERY LEVEL 3 ADDTL 15MIN: Performed by: PODIATRIST

## 2020-10-20 PROCEDURE — 7100000010 HC PHASE II RECOVERY - FIRST 15 MIN: Performed by: PODIATRIST

## 2020-10-20 PROCEDURE — 3700000000 HC ANESTHESIA ATTENDED CARE: Performed by: PODIATRIST

## 2020-10-20 PROCEDURE — 7100000000 HC PACU RECOVERY - FIRST 15 MIN: Performed by: PODIATRIST

## 2020-10-20 PROCEDURE — 2709999900 HC NON-CHARGEABLE SUPPLY: Performed by: PODIATRIST

## 2020-10-20 PROCEDURE — C1713 ANCHOR/SCREW BN/BN,TIS/BN: HCPCS | Performed by: PODIATRIST

## 2020-10-20 PROCEDURE — 73630 X-RAY EXAM OF FOOT: CPT

## 2020-10-20 DEVICE — K WIRE FIX L6IN DIA1.1MM ST S STL 3 SIDE DBL TRCR BOTH END: Type: IMPLANTABLE DEVICE | Site: FOOT | Status: FUNCTIONAL

## 2020-10-20 DEVICE — KIT STPL BRDG 18MM LEG 18MM MAX CLSR 10.4MM BME ELITE: Type: IMPLANTABLE DEVICE | Site: FOOT | Status: FUNCTIONAL

## 2020-10-20 DEVICE — SCREW BNE L48MM DIA4MM S STL CANN SHT 1/3 THRD SM HEX SOCK: Type: IMPLANTABLE DEVICE | Site: FOOT | Status: FUNCTIONAL

## 2020-10-20 RX ORDER — FENTANYL CITRATE 50 UG/ML
25 INJECTION, SOLUTION INTRAMUSCULAR; INTRAVENOUS EVERY 5 MIN PRN
Status: DISCONTINUED | OUTPATIENT
Start: 2020-10-20 | End: 2020-10-20 | Stop reason: HOSPADM

## 2020-10-20 RX ORDER — LIDOCAINE HYDROCHLORIDE 20 MG/ML
INJECTION, SOLUTION EPIDURAL; INFILTRATION; INTRACAUDAL; PERINEURAL PRN
Status: DISCONTINUED | OUTPATIENT
Start: 2020-10-20 | End: 2020-10-20 | Stop reason: SDUPTHER

## 2020-10-20 RX ORDER — SODIUM CHLORIDE 0.9 % (FLUSH) 0.9 %
10 SYRINGE (ML) INJECTION EVERY 12 HOURS SCHEDULED
Status: DISCONTINUED | OUTPATIENT
Start: 2020-10-20 | End: 2020-10-20 | Stop reason: HOSPADM

## 2020-10-20 RX ORDER — HYDROMORPHONE HCL 110MG/55ML
0.25 PATIENT CONTROLLED ANALGESIA SYRINGE INTRAVENOUS EVERY 5 MIN PRN
Status: DISCONTINUED | OUTPATIENT
Start: 2020-10-20 | End: 2020-10-20 | Stop reason: HOSPADM

## 2020-10-20 RX ORDER — DEXAMETHASONE SODIUM PHOSPHATE 10 MG/ML
INJECTION, SOLUTION INTRAMUSCULAR; INTRAVENOUS PRN
Status: DISCONTINUED | OUTPATIENT
Start: 2020-10-20 | End: 2020-10-20 | Stop reason: SDUPTHER

## 2020-10-20 RX ORDER — LIDOCAINE HYDROCHLORIDE 10 MG/ML
1 INJECTION, SOLUTION EPIDURAL; INFILTRATION; INTRACAUDAL; PERINEURAL
Status: DISCONTINUED | OUTPATIENT
Start: 2020-10-21 | End: 2020-10-20 | Stop reason: HOSPADM

## 2020-10-20 RX ORDER — KETOROLAC TROMETHAMINE 30 MG/ML
INJECTION, SOLUTION INTRAMUSCULAR; INTRAVENOUS PRN
Status: DISCONTINUED | OUTPATIENT
Start: 2020-10-20 | End: 2020-10-20 | Stop reason: SDUPTHER

## 2020-10-20 RX ORDER — PROPOFOL 10 MG/ML
INJECTION, EMULSION INTRAVENOUS CONTINUOUS PRN
Status: DISCONTINUED | OUTPATIENT
Start: 2020-10-20 | End: 2020-10-20 | Stop reason: SDUPTHER

## 2020-10-20 RX ORDER — FENTANYL CITRATE 50 UG/ML
INJECTION, SOLUTION INTRAMUSCULAR; INTRAVENOUS PRN
Status: DISCONTINUED | OUTPATIENT
Start: 2020-10-20 | End: 2020-10-20 | Stop reason: SDUPTHER

## 2020-10-20 RX ORDER — SODIUM CHLORIDE, SODIUM LACTATE, POTASSIUM CHLORIDE, CALCIUM CHLORIDE 600; 310; 30; 20 MG/100ML; MG/100ML; MG/100ML; MG/100ML
INJECTION, SOLUTION INTRAVENOUS CONTINUOUS
Status: DISCONTINUED | OUTPATIENT
Start: 2020-10-21 | End: 2020-10-20 | Stop reason: HOSPADM

## 2020-10-20 RX ORDER — SODIUM CHLORIDE 0.9 % (FLUSH) 0.9 %
10 SYRINGE (ML) INJECTION PRN
Status: DISCONTINUED | OUTPATIENT
Start: 2020-10-20 | End: 2020-10-20 | Stop reason: HOSPADM

## 2020-10-20 RX ORDER — PROPOFOL 10 MG/ML
INJECTION, EMULSION INTRAVENOUS PRN
Status: DISCONTINUED | OUTPATIENT
Start: 2020-10-20 | End: 2020-10-20 | Stop reason: SDUPTHER

## 2020-10-20 RX ORDER — ONDANSETRON 2 MG/ML
INJECTION INTRAMUSCULAR; INTRAVENOUS PRN
Status: DISCONTINUED | OUTPATIENT
Start: 2020-10-20 | End: 2020-10-20 | Stop reason: SDUPTHER

## 2020-10-20 RX ORDER — OXYCODONE HYDROCHLORIDE AND ACETAMINOPHEN 5; 325 MG/1; MG/1
1 TABLET ORAL EVERY 6 HOURS PRN
Qty: 28 TABLET | Refills: 0 | Status: SHIPPED | OUTPATIENT
Start: 2020-10-20 | End: 2020-10-27

## 2020-10-20 RX ORDER — ONDANSETRON 2 MG/ML
4 INJECTION INTRAMUSCULAR; INTRAVENOUS
Status: DISCONTINUED | OUTPATIENT
Start: 2020-10-20 | End: 2020-10-20 | Stop reason: HOSPADM

## 2020-10-20 RX ORDER — SODIUM CHLORIDE 9 MG/ML
INJECTION, SOLUTION INTRAVENOUS CONTINUOUS
Status: DISCONTINUED | OUTPATIENT
Start: 2020-10-21 | End: 2020-10-20

## 2020-10-20 RX ADMIN — PROPOFOL 100 MCG/KG/MIN: 10 INJECTION, EMULSION INTRAVENOUS at 09:40

## 2020-10-20 RX ADMIN — PROPOFOL 150 MCG/KG/MIN: 10 INJECTION, EMULSION INTRAVENOUS at 07:29

## 2020-10-20 RX ADMIN — SODIUM CHLORIDE, POTASSIUM CHLORIDE, SODIUM LACTATE AND CALCIUM CHLORIDE: 600; 310; 30; 20 INJECTION, SOLUTION INTRAVENOUS at 06:51

## 2020-10-20 RX ADMIN — PROPOFOL 200 MG: 10 INJECTION, EMULSION INTRAVENOUS at 07:29

## 2020-10-20 RX ADMIN — DEXAMETHASONE SODIUM PHOSPHATE 10 MG: 10 INJECTION INTRAMUSCULAR; INTRAVENOUS at 07:43

## 2020-10-20 RX ADMIN — LIDOCAINE HYDROCHLORIDE 100 MG: 20 INJECTION, SOLUTION EPIDURAL; INFILTRATION; INTRACAUDAL; PERINEURAL at 07:29

## 2020-10-20 RX ADMIN — CEFAZOLIN 2 G: 10 INJECTION, POWDER, FOR SOLUTION INTRAVENOUS at 07:34

## 2020-10-20 RX ADMIN — SODIUM CHLORIDE, POTASSIUM CHLORIDE, SODIUM LACTATE AND CALCIUM CHLORIDE: 600; 310; 30; 20 INJECTION, SOLUTION INTRAVENOUS at 09:41

## 2020-10-20 RX ADMIN — ONDANSETRON 4 MG: 2 INJECTION, SOLUTION INTRAMUSCULAR; INTRAVENOUS at 07:43

## 2020-10-20 RX ADMIN — KETOROLAC TROMETHAMINE 15 MG: 30 INJECTION, SOLUTION INTRAMUSCULAR; INTRAVENOUS at 09:40

## 2020-10-20 RX ADMIN — Medication 50 MCG: at 07:25

## 2020-10-20 ASSESSMENT — PULMONARY FUNCTION TESTS
PIF_VALUE: 3
PIF_VALUE: 3
PIF_VALUE: 2
PIF_VALUE: 5
PIF_VALUE: 16
PIF_VALUE: 16
PIF_VALUE: 2
PIF_VALUE: 0
PIF_VALUE: 16
PIF_VALUE: 3
PIF_VALUE: 15
PIF_VALUE: 14
PIF_VALUE: 2
PIF_VALUE: 16
PIF_VALUE: 16
PIF_VALUE: 14
PIF_VALUE: 15
PIF_VALUE: 16
PIF_VALUE: 15
PIF_VALUE: 3
PIF_VALUE: 16
PIF_VALUE: 15
PIF_VALUE: 3
PIF_VALUE: 1
PIF_VALUE: 16
PIF_VALUE: 3
PIF_VALUE: 2
PIF_VALUE: 16
PIF_VALUE: 16
PIF_VALUE: 3
PIF_VALUE: 16
PIF_VALUE: 16
PIF_VALUE: 2
PIF_VALUE: 16
PIF_VALUE: 15
PIF_VALUE: 16
PIF_VALUE: 2
PIF_VALUE: 14
PIF_VALUE: 2
PIF_VALUE: 3
PIF_VALUE: 2
PIF_VALUE: 14
PIF_VALUE: 14
PIF_VALUE: 16
PIF_VALUE: 2
PIF_VALUE: 1
PIF_VALUE: 15
PIF_VALUE: 2
PIF_VALUE: 15
PIF_VALUE: 15
PIF_VALUE: 3
PIF_VALUE: 14
PIF_VALUE: 15
PIF_VALUE: 16
PIF_VALUE: 15
PIF_VALUE: 16
PIF_VALUE: 2
PIF_VALUE: 16
PIF_VALUE: 16
PIF_VALUE: 2
PIF_VALUE: 16
PIF_VALUE: 15
PIF_VALUE: 16
PIF_VALUE: 3
PIF_VALUE: 15
PIF_VALUE: 14
PIF_VALUE: 15
PIF_VALUE: 16
PIF_VALUE: 1
PIF_VALUE: 16
PIF_VALUE: 16
PIF_VALUE: 17
PIF_VALUE: 3
PIF_VALUE: 17
PIF_VALUE: 3
PIF_VALUE: 16
PIF_VALUE: 14
PIF_VALUE: 13
PIF_VALUE: 16
PIF_VALUE: 2
PIF_VALUE: 16
PIF_VALUE: 16
PIF_VALUE: 3
PIF_VALUE: 3
PIF_VALUE: 2
PIF_VALUE: 14
PIF_VALUE: 16
PIF_VALUE: 3
PIF_VALUE: 2
PIF_VALUE: 15
PIF_VALUE: 3
PIF_VALUE: 2
PIF_VALUE: 16
PIF_VALUE: 16
PIF_VALUE: 15
PIF_VALUE: 16
PIF_VALUE: 2
PIF_VALUE: 22
PIF_VALUE: 12
PIF_VALUE: 14
PIF_VALUE: 14
PIF_VALUE: 16
PIF_VALUE: 15
PIF_VALUE: 16
PIF_VALUE: 16
PIF_VALUE: 15
PIF_VALUE: 16
PIF_VALUE: 16
PIF_VALUE: 18
PIF_VALUE: 2
PIF_VALUE: 3
PIF_VALUE: 15
PIF_VALUE: 1
PIF_VALUE: 1
PIF_VALUE: 2
PIF_VALUE: 15
PIF_VALUE: 2
PIF_VALUE: 15
PIF_VALUE: 3
PIF_VALUE: 16
PIF_VALUE: 16
PIF_VALUE: 3
PIF_VALUE: 15
PIF_VALUE: 16
PIF_VALUE: 3
PIF_VALUE: 16
PIF_VALUE: 15
PIF_VALUE: 2
PIF_VALUE: 2
PIF_VALUE: 17
PIF_VALUE: 15
PIF_VALUE: 15
PIF_VALUE: 16
PIF_VALUE: 16
PIF_VALUE: 3
PIF_VALUE: 14

## 2020-10-20 ASSESSMENT — PAIN SCALES - GENERAL
PAINLEVEL_OUTOF10: 0

## 2020-10-20 ASSESSMENT — PAIN - FUNCTIONAL ASSESSMENT: PAIN_FUNCTIONAL_ASSESSMENT: 0-10

## 2020-10-20 NOTE — ANESTHESIA POSTPROCEDURE EVALUATION
Department of Anesthesiology  Postprocedure Note    Patient: Faby Matta  MRN: [id number]  YOB: 1960  Date of evaluation: 10/20/2020  Time:  4:30 PM     Procedure Summary     Date:  10/20/20 Room / Location:  [address] [place] [address] / [address] [place] [address]    Anesthesia Start:  0725 Anesthesia Stop:  1983    Procedure:  RIGHT FOOT LAPIDUS BUNIONECTOMY  AND RIGHT 2ND HAMMERTOE CORRECTION     C-ARM   NITINOL STAPLE & 3.0 AND 4.0 CANNULATED SCREW   HINTERMANN RETRACTOR (Right Foot) Diagnosis:  (DX   HALLUX VALGUS RIGHT FOOT  RIGHT 2ND HAMMERTOE)    Surgeon:  Ehsan Castro DPM Responsible Provider:  Diana Hartley MD    Anesthesia Type:  general ASA Status:  3          Anesthesia Type: general    Misty Phase I: Misty Score: 10    Misty Phase II: Misty Score: 10    Last vitals: Reviewed and per EMR flowsheets.        Anesthesia Post Evaluation    Patient location during evaluation: PACU  Patient participation: complete - patient participated  Level of consciousness: awake  Airway patency: patent  Nausea & Vomiting: no nausea  Complications: no  Cardiovascular status: blood pressure returned to baseline  Respiratory status: acceptable  Hydration status: euvolemic

## 2020-10-20 NOTE — OP NOTE
PATIENT NAME: Dora Murry  YOB: 1960  -  56 y.o. male  MRN: [id number]  DATE: 10/20/2020  BILLING #: [id number]     Surgeon(s):  Luna Pineda DPM      ASSISTANTS: Damaso Gill DPM     PRE-OP DIAGNOSIS:   1. Hallux abducto valgus deformity, right  2. 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 Hammer toe deformity 2nd digit, right     POST-OP DIAGNOSIS: Same as above.     PROCEDURE:   1. 1st TMTJ arthrodesis (Lapidus), right  2. Cheilectomy of the 1st MTPJ, right  3. 2nd PIPJ arthrodesis, right  4. Application of short leg posterior splint, right     ANESTHESIA: MAC/Local (10cc of a 8:2 mixture of 2% lidocaine plain and 2% lidocaine with epinephrine)     HEMOSTASIS: Pneumatic thigh tourniquet @ 300 mmHg for 81 minutes.     ESTIMATED BLOOD LOSS: Less than 20cc.     MATERIALS:   Implant Name Type Inv. Item Serial No.  Lot No. LRB No. Used Action   IMPL KWIRE .[id number] Screw/Plate/Nail/Marcus IMPL KWIRE .Ricky.Ishikawa   [employer]   Right 4 Implanted and Explanted   SCREW LOY SHRT THRD SS 4.0X48MM Screw/Plate/Nail/Marcus SCREW LOY SHRT THRD SS 4.0X48MM   [employer]   Right 1 Implanted   IMPL BME ELITE [id number] STR BRIDGE 2 LEG Fastener IMPL BME ELITE [id number] STR BRIDGE 2 LEG   [employer] [id number] Right 1 Implanted   IMPL KWIRE 0.45 X 6IN ST Screw/Plate/Nail/Marcus IMPL KWIRE 0.45 X [id number]   Right 2 Implanted         INJECTABLES: 13cc of a 12:1 mixture of 0.5% marcaine plain and dexamethasone.     SPECIMEN:   * No specimens in log *     COMPLICATIONS: None.     FINDINGS: As expected. INDICATION FOR PROCEDURE: Patient has had a painful bunion deformity as well as a hypermobile first ray deformity to the right foot. The patient also has a painful right second hammertoe deformity. Patient has failed conservative therapies up until this point. The patient elects to undergo surgical correction at this time. All risks and benefits were discussed.  No guarantees were was then inserted across the first TMTJ, there is noted to be adequate compression across the arthrodesis site. Maintenance of our corrected position of the first ray was then checked again with intraoperative fluoroscopy. Next, an 18 x 18 mm staple was then placed dorsally across the arthrodesis site at the first TMTJ. Again, intraoperative fluoroscopy was then used to check the alignment of the first ray which was noted to be rectus. Attention was then directed to the distal aspect of the first metatarsal head. There is noted to be significant dorsal osteophytes around the first MTPJ complex. Using the sagittal saw the dorsal osteophytes were sharply resected from the first metatarsal head. Again using the sagittal saw the medial eminence was sharply resected from the medial aspect of the first metatarsal head. Attention was then directed to the right second digit at the PIPJ where a linear incision was created with a #15 blade. The incision was deepened with a combination of sharp and blunt dissection. The extensor tendon was then visualized and a transverse incision was created at the level of the PIPJ with a #15 blade. The head of the proximal phalanx was resected sharply with the sagittal saw. The base of the middle phalanx was also resected sharply with the sagittal saw. A 0.045 K wire was then retrograded from the PIPJ through the distal aspect of the digit just inferior to the nail. The K wire was then advanced anterograde across the PIPJ just short of the MTPJ. Correction of the digit was then checked with intraoperative fluoroscopy and the digit was noted to be rectus. The 0.045 K wire was then bent and capped with a Jurgan ball. The surgical sites were then irrigated with normal saline. The surgical sites were then coapted in sequential layers. A postoperative injection of 13 cc of a 12-1 mixture of 0.5% Marcaine plain and dexamethasone was injected.     Dressings consisted of adaptic, 4 x 4s, Kerlix and an ACE bandage. The pneumatic tourniquet was released and immediate hyperemic flush was noted to all five digits of the right foot. A short leg fiberglass posterior splint was then applied to the right lower extremity. The patient tolerated the above procedure and anesthesia well without complications. The patient was transported from the operating room to the PACU with vital signs stable and vascular status intact to the right foot. The patient will follow up with Dr. Jayshree Chase as scheduled.        Electronically signed by Merry Henry DPM on 10/20/2020 at 10:04 AM

## 2020-10-20 NOTE — BRIEF OP NOTE
PODIATRY BRIEF OP NOTE    PATIENT NAME: Florentino Kamara  YOB: 1960  -  56 y.o. male  MRN: [id number]  DATE: 10/20/2020  BILLING #: [id number]    Surgeon(s):  Angella Mitchell DPM     ASSISTANTS: Valarie Morales DPM    PRE-OP DIAGNOSIS:   1. Hallux abducto valgus deformity, right  2. 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 Hammer toe deformity 2nd digit, right    POST-OP DIAGNOSIS: Same as above. PROCEDURE:   1. 1st TMTJ arthrodesis (Lapidus), right  2. Cheilectomy of the 1st MTPJ, right  3. 2nd PIPJ arthrodesis, right    ANESTHESIA: MAC/Local (10cc of a 8:2 mixture of 2% lidocaine plain and 2% lidocaine with epinephrine)    HEMOSTASIS: Pneumatic thigh tourniquet @ 300 mmHg for 81 minutes. ESTIMATED BLOOD LOSS: Less than 20cc. MATERIALS:   Implant Name Type Inv. Item Serial No.  Lot No. LRB No. Used Action   IMPL KWIRE .[id number] Screw/Plate/Nail/Marcus IMPL KWIRE .Aelian.Null  [employer]  Right 4 Implanted and Explanted   SCREW LOY SHRT THRD SS 4.0X48MM Screw/Plate/Nail/Marcus SCREW LOY SHRT THRD SS 4.0X48MM  [employer]  Right 1 Implanted   IMPL BME ELITE [id number] STR BRIDGE 2 LEG Fastener IMPL BME ELITE [id number] STR BRIDGE 2 LEG  [employer] [id number] Right 1 Implanted   IMPL KWIRE 0.45 X 6IN ST Screw/Plate/Nail/Marcus IMPL KWIRE 0.45 X [address]  Right 2 Implanted       INJECTABLES: 13cc of a 12:1 mixture of 0.5% marcaine plain and dexamethasone. SPECIMEN:   * No specimens in log *    COMPLICATIONS: None. FINDINGS: As expected. The patient was counseled at length about the risks of mariah Covid-19 during their perioperative period and any recovery window from their procedure. The patient was made aware that mariah Covid-19  may worsen their prognosis for recovering from their procedure  and lend to a higher morbidity and/or mortality risk.   All material risks, benefits, and reasonable alternatives including postponing the procedure were discussed. The patient does wish to proceed with the procedure at this time.     Electronically signed by Dara Seymour DPM on 10/20/2020 at 10:00 AM

## 2020-10-20 NOTE — H&P
History and Physical Update    Pt Name: Bonnie Vanessa  MRN: [id number]  YOB: 1960  Date of evaluation: 10/20/2020      [x] I have reviewed the H & P found in Epic by José Miguel Oro CNP from 10/06/2020 which meets the criteria for an Interval History and Physical note. [x] I have examined  Bonnie Vanessa a 56 y.o., male who is scheduled for a right foot Lapidus bunionectomy by Dr. Jayshree Chase due to right foot hallux valgus. The patient denies health changes since his appointment with José Miguel Oro CNP on 10/06/2020. Pt denies fever, chills, productive cough, SOB, chest pain, open sores, rashes, and wounds. Pt denies history of diabetes. Multiple vitamins-minerals tablet was last taken on 10/18/2020. Discussed the pt's medical history, hypoglossal nerve stimulator, and history of difficult intubation with Dr. Tavo Andrade. Dr. Jayshree Chase is aware of the pt's hypoglossal nerve stimulator. Pt states his hypoglossal nerve stimulator is turned off at this time. Vital signs: /83   Pulse 79   Temp 97.1 °F (36.2 °C) (Temporal)   Resp 16   Ht 5' 7\" (1.702 m)   Wt 160 lb (72.6 kg)   SpO2 97%   BMI 25.06 kg/m²      Allergies:  Sulfa antibiotics    Past medical history, surgical history, social history, and family history were reviewed and updated in EPIC as indicated. Medications:    Prior to Admission medications    Medication Sig Start Date End Date Taking? Authorizing Provider   sildenafil (VIAGRA) 100 MG tablet Take 100 mg by mouth as needed  3/16/15  Yes Historical Provider, MD   pregabalin (LYRICA) 100 MG capsule Take 100 mg by mouth 2 times daily.     Historical Provider, MD   omeprazole (PRILOSEC) 20 MG delayed release capsule Take 40 mg by mouth daily    Historical Provider, MD   mirabegron (MYRBETRIQ) 25 MG TB24 Take 25 mg by mouth 2 times daily    Historical Provider, MD   buPROPion (WELLBUTRIN) 100 MG tablet Take 200 mg by mouth 2 times daily    Historical Provider, MD   lisinopril (PRINIVIL;ZESTRIL) 20 MG tablet Take 1 tablet by mouth daily 7/29/17   Historical Provider, MD   simvastatin (ZOCOR) 40 MG tablet Take 1 tablet by mouth daily 8/1/17   Historical Provider, MD   Cyanocobalamin (VITAMIN B-12 IJ) Inject 1,000 mcg into the skin daily     Historical Provider, MD   Multiple Vitamins-Minerals (THERAPEUTIC MULTIVITAMIN-MINERALS) tablet Take 1 tablet by mouth daily    Historical Provider, MD       This is a 56 y.o. male who is pleasant, cooperative, alert and oriented x 3, in no acute distress. Heart: Hypoglossal nerve stimulator generator in the right upper chest. Regular rate and rhythm without murmur, gallop, or rub. Lungs: Normal respiratory effort, unlabored, and clear to auscultation without wheezes or rales bilaterally. Abdomen: Soft, non-tender, non-distended with active bowel sounds. Pedal pulses: 2+ bilaterally. Right bunion.          Labs:  Recent Labs     10/06/20  1551   HGB 14.7   HCT 43.0   WBC 6.8   MCV 97.9         K 4.1      CO2 25   BUN 18   CREATININE 0.73       Recent Labs     10/15/20  0900   COVID19 Not Detected         ENRIKE Bull-BC  Electronically signed 10/20/2020 at 7:06 AM      MIKAYLA Greenberg CNP    Nurse Practitioner    General Surgery    H&P    Cosign Needed    Date of Service:  10/6/2020  3:00 PM          Related encounter: Pre-Admission Testing Visit from 10/6/2020 in [facility] PRE-ADMIT [facility]          Cosign Needed        Expand All Collapse All     Show:Clear all  [x]Manual[x]Template[]Copied    Added by:  [x]MIKAYLA Melton CNP    []Eleazar for details  History and Physical Service   [facility], [facility]            Date of Evaluation:     10/6/2020  Patient name:              Nevin Gay  MRN:                           [id number]  YOB: 1960  PCP:                            Evelyn Marrero     History Obtained From:      Patient, (erectile dysfunction)      History of difficult intubation       Pt states a glidescope was needed in the past for intubation.  Hyperlipidemia      Hypertension      Mitral valve disease      PONV (postoperative nausea and vomiting)      Prostate cancer ([facility] [address]      Radiation cystitis      Sleep apnea       semnoplasty            Past Surgical History:      Past Surgical History         Past Surgical History:   Procedure Laterality Date    APPENDECTOMY        BICEPS TENDON REPAIR        BLADDER SURGERY         stricture,biopsy,cauterized    JOINT REPLACEMENT Bilateral       total joint    KNEE SURGERY        OTHER SURGICAL HISTORY   12/05/2018     hypoglossal nerve stimulator insertion     OTHER SURGICAL HISTORY   06/12/2020     revision of hypoglossal nerve stimulator    PROSTATECTOMY        TONSILLECTOMY AND ADENOIDECTOMY        UVULOPALATOPHARYGOPLASTY   2004 or 2005     with somnoplasty            Medications Prior to Admission:      Home Medications   Prior to Admission medications    Medication Sig Start Date End Date Taking? Authorizing Provider   pregabalin (LYRICA) 100 MG capsule Take 100 mg by mouth 2 times daily.      Yes Historical Provider, MD   omeprazole (PRILOSEC) 20 MG delayed release capsule Take 40 mg by mouth daily     Yes Historical Provider, MD   mirabegron (MYRBETRIQ) 25 MG TB24 Take 25 mg by mouth 2 times daily     Yes Historical Provider, MD   buPROPion (WELLBUTRIN) 100 MG tablet Take 200 mg by mouth 2 times daily     Yes Historical Provider, MD   lisinopril (PRINIVIL;ZESTRIL) 20 MG tablet Take 1 tablet by mouth daily 7/29/17     Historical Provider, MD   simvastatin (ZOCOR) 40 MG tablet Take 1 tablet by mouth daily 8/1/17     Historical Provider, MD   Cyanocobalamin (VITAMIN B-12 IJ) Inject 1,000 mcg into the skin daily        Historical Provider, MD   Multiple Vitamins-Minerals (THERAPEUTIC MULTIVITAMIN-MINERALS) tablet Take 1 tablet by mouth daily       Historical Provider, MD   sildenafil (VIAGRA) 100 MG tablet Take 100 mg by mouth as needed  3/16/15     Historical Provider, MD            Allergies:      Sulfa antibiotics     Social History:      Tobacco:    reports that he has never smoked. He has never used smokeless tobacco.  Alcohol:      reports current alcohol use. Drug Use:  reports no history of drug use. Family History:      Family History   Family History   Problem Relation Age of Onset    Hypertension Father      Lung Cancer Father      Seizures Father      Other Father           Difficult intubation            Review of Systems:      Positive and Negative as described in HPI. CONSTITUTIONAL: Negative for fevers, chills, sweats, fatigue, and weight loss. HEENT: Pt wears reading glasses. Negative for hearing changes, rhinorrhea, and throat pain. RESPIRATORY: Negative for shortness of breath, cough, congestion, and wheezing. CARDIOVASCULAR: Negative for chest pain, blood clot, irregular heartbeat, and palpitations. Pt does not follow-up with a cardiologist.  GASTROINTESTINAL: Negative for reflux, nausea, vomiting, diarrhea, constipation, change in bowel habits, and abdominal pain. GENITOURINARY: History of prostate cancer. S/p prostatectomy and radiation. Frequency. Nocturia. Urgency. Mild incontinence. Mild dysuria at the end of urinary stream.  Hematuria last week. Pt follows-up with urology at the [facility]. Negative for difficulty of urination and retention. INTEGUMENT: Negative for rash, skin lesions, and easy bruising. HEMATOLOGIC/LYMPHATIC: Negative for swelling/edema. ALLERGIC/IMMUNOLOGIC: Negative for urticaria and itching. ENDOCRINE: Negative for increase in thirst and heat or cold intolerance. MUSCULOSKELETAL: See HPI. NEUROLOGICAL: Left leg numbness, tingling, and nerve pain. Unstable gait. Negative for headaches, dizziness, and lightheadedness.   Pt denies history of a stroke, seizure, and head injury. BEHAVIOR/PSYCH: History of ADD. Negative for depression and anxiety. Physical Exam:   BP (!) 140/93   Pulse 96   Temp 97.5 °F (36.4 °C) (Temporal)   Resp 16   Ht 5' 7\" (1.702 m)   Wt 160 lb 7.9 oz (72.8 kg)   SpO2 98%   BMI 25.14 kg/m²  No results for input(s): POCGLU in the last 72 hours. General Appearance:  Alert, well appearing, and in no acute distress. Mental status: Oriented to person, place, and time. Head: Normocephalic and atraumatic. Eye: No icterus, redness, pupils equal and reactive, extraocular eye movements intact, and conjunctiva clear. Ear: Hearing grossly intact. Nose: No drainage noted. Mouth: Mucous membranes moist.  Neck: Supple and no carotid bruits noted. Lungs: Bilateral equal air entry, clear to auscultation, no wheezing, rales or rhonchi, and normal effort. Cardiovascular: Hypoglossal nerve stimulator generator in the right upper chest. Normal rate, regular rhythm, no murmur, gallop, or rub. Abdomen: Soft, non-tender, non-distended, and active bowel sounds. Neurologic: Normal speech and cranial nerves II through XII grossly intact. Strength 5/5 bilaterally. Skin: No gross lesions, rashes, bruising, or bleeding on exposed skin area. Extremities: Posterior tibial pulses 2+ bilaterally. No ankle edema. No calf tenderness with palpation. Psych: Normal affect.       Investigations:       Laboratory Testing:  Recent Results         Recent Results (from the past 24 hour(s))   CBC Auto Differential     Collection Time: 10/06/20  3:51 PM   Result Value Ref Range     WBC 6.8 3.5 - 11.3 k/uL     RBC 4.39 4.21 - 5.77 m/uL     Hemoglobin 14.7 13.0 - 17.0 g/dL     Hematocrit 43.0 40.7 - 50.3 %     MCV 97.9 82.6 - 102.9 fL     MCH 33.5 25.2 - 33.5 pg     MCHC 34.2 28.4 - 34.8 g/dL     RDW 11.8 11.8 - 14.4 %     Platelets 845 197 - 534 k/uL     MPV 9.0 8.1 - 13.5 fL     NRBC Automated 0.0 0.0 per 100 WBC     Differential Type NOT REPORTED       Seg Neutrophils 77 (H) 36 - 65 %     Lymphocytes 12 (L) 24 - 43 %     Monocytes 8 3 - 12 %     Eosinophils % 1 1 - 4 %     Basophils 1 0 - 2 %     Immature Granulocytes 1 (H) 0 %     Segs Absolute 5.16 1.50 - 8.10 k/uL     Absolute Lymph # 0.84 (L) 1.10 - 3.70 k/uL     Absolute Mono # 0.56 0.10 - 1.20 k/uL     Absolute Eos # 0.09 0.00 - 0.44 k/uL     Basophils Absolute 0.06 0.00 - 0.20 k/uL     Absolute Immature Granulocyte 0.06 0.00 - 0.30 k/uL     WBC Morphology NOT REPORTED       RBC Morphology NOT REPORTED       Platelet Estimate NOT REPORTED     BUN & Creatinine     Collection Time: 10/06/20  3:51 PM   Result Value Ref Range     BUN 18 8 - 23 mg/dL     CREATININE 0.73 0.70 - 1.20 mg/dL     GFR Non-African American >60 >60 mL/min     GFR African American >60 >60 mL/min     GFR Comment            GFR Staging NOT REPORTED     Electrolyte Panel     Collection Time: 10/06/20  3:51 PM   Result Value Ref Range     Sodium 137 135 - 144 mmol/L     Potassium 4.1 3.7 - 5.3 mmol/L     Chloride 102 98 - 107 mmol/L     CO2 25 20 - 31 mmol/L     Anion Gap 10 9 - 17 mmol/L               Recent Labs     10/06/20  1551   HGB 14.7   HCT 43.0   WBC 6.8   MCV 97.9      K 4.1      CO2 25   BUN 18   CREATININE 0.73         No results for input(s): COVID19 in the last 720 hours. EKG: 10/06/2020. See Epic. Diagnosis:       1. Right foot hallux valgus     Plans:      1.  Right foot Lapidus bunionectomy        Carina Garcia, APRN - CNP  10/6/2020  5:11 PM               Revision History

## 2020-10-20 NOTE — ANESTHESIA PRE PROCEDURE
Department of Anesthesiology  Preprocedure Note       Name:  Cecil Bracket   Age:  56 y.o.  :  1960                                          MRN:  [id number]         Date:  10/20/2020      Surgeon: Torie Garcia):  Lincoln Zuñiga DPM    Procedure: Procedure(s):  RIGHT FOOT LAPIDUS BUNIONECTOMY  AND RIGHT 2ND HAMMERTOE CORRECTION     C-ARM   NITINOL STAPLE & 3.0 AND 4.0 CANNULATED SCREW   HINTERMANN RETRACTOR    Medications prior to admission:   Prior to Admission medications    Medication Sig Start Date End Date Taking? Authorizing Provider   sildenafil (VIAGRA) 100 MG tablet Take 100 mg by mouth as needed  3/16/15  Yes Historical Provider, MD   pregabalin (LYRICA) 100 MG capsule Take 100 mg by mouth 2 times daily.     Historical Provider, MD   omeprazole (PRILOSEC) 20 MG delayed release capsule Take 40 mg by mouth daily    Historical Provider, MD   mirabegron (MYRBETRIQ) 25 MG TB24 Take 25 mg by mouth 2 times daily    Historical Provider, MD   buPROPion (WELLBUTRIN) 100 MG tablet Take 200 mg by mouth 2 times daily    Historical Provider, MD   lisinopril (PRINIVIL;ZESTRIL) 20 MG tablet Take 1 tablet by mouth daily 17   Historical Provider, MD   simvastatin (ZOCOR) 40 MG tablet Take 1 tablet by mouth daily 17   Historical Provider, MD   Cyanocobalamin (VITAMIN B-12 IJ) Inject 1,000 mcg into the skin daily     Historical Provider, MD   Multiple Vitamins-Minerals (THERAPEUTIC MULTIVITAMIN-MINERALS) tablet Take 1 tablet by mouth daily    Historical Provider, MD       Current medications:    Current Facility-Administered Medications   Medication Dose Route Frequency Provider Last Rate Last Dose    ceFAZolin (ANCEF) 2 g in dextrose 5 % 50 mL IVPB  2 g Intravenous Once Lincoln Zuñiga DPM        [START ON 10/21/2020] lactated ringers infusion   Intravenous Continuous Celesta Min,  mL/hr at 10/20/20 0651      sodium chloride flush 0.9 % injection 10 mL  10 mL Intravenous 2 times per day Nilo HEAD Steven Platt DO        sodium chloride flush 0.9 % injection 10 mL  10 mL Intravenous PRN Peyton Castillo, DO        [START ON 10/21/2020] lidocaine PF 1 % injection 1 mL  1 mL Intradermal Once PRN Peyton Jonathan, DO           Allergies: Allergies   Allergen Reactions    Sulfa Antibiotics Hives       Problem List:    Patient Active Problem List   Diagnosis Code    Elevated prostate specific antigen (PSA) R97.20    Hypertrophy of prostate with urinary obstruction and other lower urinary tract symptoms (LUTS) N40.1    Nocturia R35.1    ED (erectile dysfunction) N52.9    Hyperlipidemia E78.5    Hypertension I10    Malignant neoplasm of prostate ([address] C61    Obstructive sleep apnea syndrome G47.33       Past Medical History:        Diagnosis Date    ADD (attention deficit disorder)     Anemia     Anesthesia complication Prior to 3645    The pt had anxiety and trouble breathing when extubated after knee arthroscopy.  Caffeine use     Does not use    Depression     ED (erectile dysfunction)     History of difficult intubation     Pt states a glidescope was needed in the past for intubation.      Hyperlipidemia     Hypertension     Mitral valve disease     Prostate cancer ([facility] [address]     Radiation cystitis     Sleep apnea     semnoplasty       Past Surgical History:        Procedure Laterality Date    APPENDECTOMY      BICEPS TENDON REPAIR      BLADDER SURGERY      stricture,biopsy,cauterized    JOINT REPLACEMENT Bilateral     total joint    KNEE SURGERY      OTHER SURGICAL HISTORY  12/05/2018    hypoglossal nerve stimulator insertion     OTHER SURGICAL HISTORY  06/12/2020    revision of hypoglossal nerve stimulator    PROSTATECTOMY      TONSILLECTOMY AND ADENOIDECTOMY      UVULOPALATOPHARYGOPLASTY  2004 or 2005    with somnoplasty       Social History:    Social History     Tobacco Use    Smoking status: Never Smoker    Smokeless tobacco: Never Used   Substance Use Topics    Alcohol use: Yes     Comment: occ                                Counseling given: Not Answered      Vital Signs (Current):   Vitals:    10/20/20 0624 10/20/20 0624 10/20/20 0700   BP:  127/83    Pulse:  109 79   Resp:  16    Temp:  97.1 °F (36.2 °C)    TempSrc:  Temporal    SpO2:  97%    Weight: 160 lb (72.6 kg)     Height: 5' 7\" (1.702 m)                                                BP Readings from Last 3 Encounters:   10/20/20 127/83   10/06/20 (!) 140/93   10/08/18 (!) 135/94       NPO Status: Time of last liquid consumption: 2000                        Time of last solid consumption: 2000                        Date of last liquid consumption: 10/19/20                        Date of last solid food consumption: 10/19/20    BMI:   Wt Readings from Last 3 Encounters:   10/20/20 160 lb (72.6 kg)   10/06/20 160 lb 7.9 oz (72.8 kg)   10/08/18 152 lb (68.9 kg)     Body mass index is 25.06 kg/m². CBC:   Lab Results   Component Value Date    WBC 6.8 10/06/2020    RBC 4.39 10/06/2020    HGB 14.7 10/06/2020    HCT 43.0 10/06/2020    MCV 97.9 10/06/2020    RDW 11.8 10/06/2020     10/06/2020       CMP:   Lab Results   Component Value Date     10/06/2020    K 4.1 10/06/2020     10/06/2020    CO2 25 10/06/2020    BUN 18 10/06/2020    CREATININE 0.73 10/06/2020    GFRAA >60 10/06/2020    LABGLOM >60 10/06/2020    GLUCOSE 102 03/19/2014    GLUCOSE 87 02/13/2012    PROT 7.5 03/19/2014    CALCIUM 10.0 03/19/2014    BILITOT 2.21 03/19/2014    ALKPHOS 65 03/19/2014    AST 31 03/19/2014    ALT 41 03/19/2014       POC Tests: No results for input(s): POCGLU, POCNA, POCK, POCCL, POCBUN, POCHEMO, POCHCT in the last 72 hours.     Coags: No results found for: PROTIME, INR, APTT    HCG (If Applicable): No results found for: PREGTESTUR, PREGSERUM, HCG, HCGQUANT     ABGs: No results found for: PHART, PO2ART, HDE0SWK, ICH0ZPA, BEART, G8PPWARF     Type & Screen (If Applicable):  No results found for: LABABO, [address]    Drug/Infectious Status (If Applicable):  No results found for: HIV, HEPCAB    COVID-19 Screening (If Applicable):   Lab Results   Component Value Date    COVID19 Not Detected 10/15/2020         Anesthesia Evaluation   history of anesthetic complications (potential difficult airway): Airway: Mallampati: II  TM distance: >3 FB   Neck ROM: full  Mouth opening: > = 3 FB Dental:          Pulmonary:normal exam    (+) sleep apnea (severe):                             Cardiovascular:  Exercise tolerance: good (>4 METS),   (+) hypertension:, hyperlipidemia    (-)  angina                Neuro/Psych:               GI/Hepatic/Renal: Neg GI/Hepatic/Renal ROS       (-) GERD       Endo/Other: Negative Endo/Other ROS   (+) malignancy/cancer (prostate). Abdominal:           Vascular:                                        Anesthesia Plan      general     ASA 3     (Lma)  Induction: intravenous. MIPS: Postoperative opioids intended and Prophylactic antiemetics administered. Anesthetic plan and risks discussed with patient. Plan discussed with CRNA.     Attending anesthesiologist reviewed and agrees with Levi Ríos MD   10/20/2020

## 2021-01-14 ENCOUNTER — HOSPITAL ENCOUNTER (EMERGENCY)
Age: 61
Discharge: HOME | End: 2021-01-14
Attending: EMERGENCY MEDICINE
Payer: COMMERCIAL

## 2021-01-14 VITALS
WEIGHT: 160.9 LBS | SYSTOLIC BLOOD PRESSURE: 128 MMHG | DIASTOLIC BLOOD PRESSURE: 69 MMHG | OXYGEN SATURATION: 97 % | HEIGHT: 67 IN | BODY MASS INDEX: 25.25 KG/M2 | HEART RATE: 88 BPM | RESPIRATION RATE: 21 BRPM | TEMPERATURE: 98.3 F

## 2021-01-14 DIAGNOSIS — M79.89 FOOT SWELLING: Primary | ICD-10-CM

## 2021-01-14 PROCEDURE — 99282 EMERGENCY DEPT VISIT SF MDM: CPT

## 2021-01-14 PROCEDURE — 93971 EXTREMITY STUDY: CPT

## 2021-01-14 ASSESSMENT — ENCOUNTER SYMPTOMS
SHORTNESS OF BREATH: 0
COLOR CHANGE: 0
SINUS PRESSURE: 0
VOMITING: 0
NAUSEA: 0
COUGH: 0

## 2021-01-15 NOTE — ED PROVIDER NOTES
[facility] ED  EMERGENCY DEPARTMENT ENCOUNTER      Pt Name: Lacho Barnes  MRN: [id number]  [place] 1960  Date of evaluation: 1/14/21  CHIEF COMPLAINT       Chief Complaint   Patient presents with    Foot Pain     pt had surgery on right foot in october. pt states swelling in foot is \"still really bad\" pt sent surgeon pics of foot today and was told to go to ER for doppler study. pt able to ambulate on foot     HISTORY OF PRESENT ILLNESS   Presents to the emergency room via private auto with concern for DVT. He had surgery on his foot in October. He has had swelling since that time which seems to have worsened recently. No erythema. Surgical incision has healed. No drainage. He was sent here by his podiatrist to rule out DVT. No history of DVT. No chest pain or shortness of breath. The history is provided by the patient. REVIEW OF SYSTEMS     Review of Systems   Constitutional: Negative for activity change and fever. HENT: Negative for congestion and sinus pressure. Respiratory: Negative for cough and shortness of breath. Cardiovascular: Positive for leg swelling. Negative for chest pain and palpitations. Gastrointestinal: Negative for nausea and vomiting. Musculoskeletal: Negative for arthralgias and myalgias. Skin: Negative for color change. Healed surgical incision   Neurological: Negative for dizziness and headaches. Psychiatric/Behavioral: Negative for agitation and confusion. PASTMEDICAL HISTORY     Past Medical History:   Diagnosis Date    ADD (attention deficit disorder)     Anemia     Anesthesia complication Prior to 3950    The pt had anxiety and trouble breathing when extubated after knee arthroscopy.  Caffeine use     Does not use    Depression     ED (erectile dysfunction)     History of difficult intubation     Pt states a glidescope was needed in the past for intubation.      Hyperlipidemia     Hypertension     Mitral valve disease     Prostate cancer ([address]     Radiation cystitis     Sleep apnea     semnoplasty     SURGICAL HISTORY       Past Surgical History:   Procedure Laterality Date    APPENDECTOMY      BICEPS TENDON REPAIR      BLADDER SURGERY      stricture,biopsy,cauterized    BUNIONECTOMY Right 10/20/2020    RIGHT FOOT LAPIDUS BUNIONECTOMY  AND RIGHT 2ND HAMMERTOE CORRECTION     C-ARM   NITINOL STAPLE & 3.0 AND 4.0 CANNULATED SCREW   HINTERMANN RETRACTOR performed by Yasmeen Subramanian DPM at [address] Bilateral     total joint   [address] OTHER SURGICAL HISTORY  2018    hypoglossal nerve stimulator insertion     OTHER SURGICAL HISTORY  2020    revision of hypoglossal nerve stimulator    PROSTATECTOMY      TONSILLECTOMY AND ADENOIDECTOMY      UVULOPALATOPHARYGOPLASTY   or     with [facility]       Discharge Medication List as of 2021  9:56 PM      CONTINUE these medications which have NOT CHANGED    Details   pregabalin (LYRICA) 100 MG capsule Take 100 mg by mouth 2 times daily. Historical Med      omeprazole (PRILOSEC) 20 MG delayed release capsule Take 40 mg by mouth dailyHistorical Med      mirabegron (MYRBETRIQ) 25 MG TB24 Take 25 mg by mouth 2 times dailyHistorical Med      buPROPion (WELLBUTRIN) 100 MG tablet Take 200 mg by mouth 2 times dailyHistorical Med      lisinopril (PRINIVIL;ZESTRIL) 20 MG tablet Take 1 tablet by mouth dailyHistorical Med      simvastatin (ZOCOR) 40 MG tablet Take 1 tablet by mouth dailyHistorical Med      Cyanocobalamin (VITAMIN B-12 IJ) Inject 1,000 mcg into the skin daily Historical Med      Multiple Vitamins-Minerals (THERAPEUTIC MULTIVITAMIN-MINERALS) tablet Take 1 tablet by mouth daily      sildenafil (VIAGRA) 100 MG tablet Take 100 mg by mouth as needed Historical Med           ALLERGIES     is allergic to sulfa antibiotics. FAMILY HISTORY     He indicated that his father is .      SOCIAL HISTORY       Social History     Tobacco Use    Smoking status: Never Smoker    Smokeless tobacco: Never Used   Substance Use Topics    Alcohol use: Yes     Comment: occ    Drug use: No     PHYSICAL EXAM     INITIAL VITALS: /69   Pulse 88   Temp 98.3 °F (36.8 °C) (Oral)   Resp 21   Ht 5' 7\" (1.702 m)   Wt 160 lb 14.4 oz (73 kg)   SpO2 97%   BMI 25.20 kg/m²    Physical Exam  Constitutional:       Appearance: Normal appearance. HENT:      Right Ear: External ear normal.      Left Ear: External ear normal.   Eyes:      General:         Right eye: No discharge. Left eye: No discharge. Conjunctiva/sclera: Conjunctivae normal.   Cardiovascular:      Rate and Rhythm: Normal rate and regular rhythm. Pulses: Normal pulses. Pulmonary:      Effort: Pulmonary effort is normal.      Breath sounds: Normal breath sounds. Musculoskeletal:      Comments: Swelling to the right ankle and foot. No erythema. Surgical incision is well-healed. No drainage. Skin:     General: Skin is warm and dry. Capillary Refill: Capillary refill takes less than 2 seconds. Findings: No erythema. Neurological:      General: No focal deficit present. Mental Status: He is alert and oriented to person, place, and time. Psychiatric:         Mood and Affect: Mood normal.         Thought Content: Thought content normal.         DIAGNOSTIC RESULTS     RADIOLOGY:All plain film, CT, MRI, and formal ultrasound images (except ED bedside ultrasound) are read by the radiologist, see reports below, unless otherwise noted in MDM or here. Interpretation per the Radiologist below, if available at the time of this note:    VL DUP LOWER EXTREMITY VENOUS RIGHT    (Results Pending)       LABS:  Labs Reviewed - No data to display    All other labs were within normal range or not returned as of this dictation.     EMERGENCY DEPARTMENT COURSE and DIFFERENTIAL DIAGNOSIS/MDM:   Vitals:    Vitals:    01/14/21 2028 01/14/21 2211   BP: 124/83 128/69   Pulse: 84 88   Resp: 18 21   Temp: 98.3 °F (36.8 °C)    TempSrc: Oral    SpO2: 98% 97%   Weight: 160 lb 14.4 oz (73 kg)    Height: 5' 7\" (1.702 m)        Medical Decision Makin-year-old male who is in no distress. He has no erythema. No clinical concern for cellulitis. Venous Dopplers negative for DVT. He will be discharged home to follow-up with his podiatrist.        The patient was given the following meds in the ED:  No orders of the defined types were placed in this encounter. FINAL IMPRESSION      1.  Foot swelling          DISPOSITION/PLAN   DISPOSITION Decision To Discharge 2021 09:56:04 PM      PATIENT REFERRED TO:   Chaya Kiser DPM  [address] [place] [address]  [phone]            DISCHARGE MEDICATIONS:     Discharge Medication List as of 2021  9:56 PM          CRITICAL CARE TIME       Please note that portions of this note were completed with a voice recognition program.      MIKAYLA Montemayor - MIKAYLA Calvillo - ALISSA  21 [id number]

## 2021-01-15 NOTE — ED PROVIDER NOTES
The patient was seen and examined by me in conjunction with the mid-level provider. I agree with his/her assessment and treatment plan. Was negative and the patient was informed.      Toshia Bernardo MD  01/14/21 1689

## 2021-05-18 ENCOUNTER — OFFICE VISIT (OUTPATIENT)
Dept: NEUROLOGY | Age: 61
End: 2021-05-18
Payer: COMMERCIAL

## 2021-05-18 VITALS
WEIGHT: 161 LBS | SYSTOLIC BLOOD PRESSURE: 135 MMHG | HEART RATE: 86 BPM | HEIGHT: 67 IN | BODY MASS INDEX: 25.27 KG/M2 | DIASTOLIC BLOOD PRESSURE: 84 MMHG

## 2021-05-18 DIAGNOSIS — G47.33 OBSTRUCTIVE SLEEP APNEA SYNDROME: Primary | ICD-10-CM

## 2021-05-18 DIAGNOSIS — R51.9 CHRONIC DAILY HEADACHE: ICD-10-CM

## 2021-05-18 DIAGNOSIS — R41.3 MEMORY LOSS: ICD-10-CM

## 2021-05-18 PROCEDURE — 99215 OFFICE O/P EST HI 40 MIN: CPT | Performed by: PSYCHIATRY & NEUROLOGY

## 2021-05-18 RX ORDER — MODAFINIL 200 MG/1
200 TABLET ORAL DAILY
COMMUNITY
Start: 2021-02-19

## 2021-05-18 ASSESSMENT — ENCOUNTER SYMPTOMS
RESPIRATORY NEGATIVE: 1
ALLERGIC/IMMUNOLOGIC NEGATIVE: 1
GASTROINTESTINAL NEGATIVE: 1
EYES NEGATIVE: 1

## 2021-05-18 NOTE — PROGRESS NOTES
Subjective:      Patient ID: Selvin Lang is a 56 y.o. male. HPI  Active problem memory loss and sleep apnea unable to tolerate nasal CPAP to undergo hypoglossal nerve stimulator surgery having had UPPP with tongue reduction on provigil as daytime stimulant last seen in October 2018  . Memory and cognitive complaints attributed to sleep apnea with nonrestorative sleep . The condition is he had in December 2018 hypoglossal nerve stimulator placement at [address] [place] [address],[address] . In January 2019 he had hip replacement delaying activation configuration . In September 2019 stimulator was activated not working right feeling that this may have milad aggravating apnea causing retropulsion of tongue . He underwent revision of hypoglossal nerve stimulator June 2021 being at low setting stimulation wanting him to get used to stimulation . Daytime fatigue and sleepiness remains attenuated partially with provigil . He is using provigil 5 days out of the week 100 mg or 200 mg dose being more focused and awake on this medication  . Otherwise he will be fatigued ad sleepy  . There has milad forgetfulness misplacing things along with having low grade headache for the past 2 to 3 months over vertex and bilateral fontal head of grade 3 to 4 over 10 using ibuprofen OTC with some effect . His has gained 9 lbs to 161 lbs , BMI 25. Significant medications provigil 200 mg po qAM . Testing B12 263, LIV negative, TSH normal , folic acid 84.4 . MRI of Head normal , EEG normal      Past Medical History:   Diagnosis Date    ADD (attention deficit disorder)     Anemia     Anesthesia complication Prior to 6614    The pt had anxiety and trouble breathing when extubated after knee arthroscopy.  Caffeine use     Does not use    Depression     ED (erectile dysfunction)     History of difficult intubation     Pt states a glidescope was needed in the past for intubation.      Hyperlipidemia     Hypertension     Mitral valve disease     Exercise per Session:    Stress:     Feeling of Stress :    Social Connections:     Frequency of Communication with Friends and Family:     Frequency of Social Gatherings with Friends and Family:     Attends [employer] Services:     Active Member of Clubs or Organizations:     Attends Club or Organization Meetings:     Marital Status:    Intimate Partner Violence:     Fear of Current or Ex-Partner:     Emotionally Abused:     Physically Abused:     Sexually Abused:        Current Outpatient Medications   Medication Sig Dispense Refill    modafinil (PROVIGIL) 200 MG tablet Take 200 mg by mouth daily.  pregabalin (LYRICA) 100 MG capsule Take 100 mg by mouth 2 times daily.  omeprazole (PRILOSEC) 20 MG delayed release capsule Take 40 mg by mouth daily      mirabegron (MYRBETRIQ) 25 MG TB24 Take 25 mg by mouth 2 times daily      buPROPion (WELLBUTRIN) 100 MG tablet Take 200 mg by mouth 2 times daily      lisinopril (PRINIVIL;ZESTRIL) 20 MG tablet Take 1 tablet by mouth daily      simvastatin (ZOCOR) 40 MG tablet Take 1 tablet by mouth daily      Cyanocobalamin (VITAMIN B-12 IJ) Inject 1,000 mcg into the skin daily       Multiple Vitamins-Minerals (THERAPEUTIC MULTIVITAMIN-MINERALS) tablet Take 1 tablet by mouth daily      sildenafil (VIAGRA) 100 MG tablet Take 100 mg by mouth as needed        No current facility-administered medications for this visit. Allergies   Allergen Reactions    Sulfa Antibiotics Hives       Review of Systems   Constitutional: Positive for unexpected weight change. HENT: Negative. Eyes: Negative. Respiratory: Negative. Cardiovascular: Negative. Gastrointestinal: Negative. Genitourinary: Positive for frequency and urgency. Musculoskeletal: Negative. Skin: Negative. Allergic/Immunologic: Negative. Neurological: Negative. Psychiatric/Behavioral: Negative.         Objective:   Physical Exam  Vitals:    05/18/21 1645   BP: 135/84 Pulse: 86     Constitutional .General appearance in no acute distress    Ears/Nose/Throat. Normal soft palate and uvula. Base of tongue normal          Neck normal  Respiratory . Breath sounds clear bilaterally  Cardiovascular. Regular rate and rhythm and normal heart sounds  Muskuloskeletal.Normal tone. Muscle strength grossly normal nonfocal.   Gait and station normal  Orientation and mood. Alert and oriented. May 18 , 2021 . President Edy Vasquez .  ? . WORLD able to spell forwards and back wards . Serial 7 to 72   Short term memory 3 words out of 3 in one minute   Attention and concentration normal   Language and speech. Normal quality with no aphasia  Cranial neve 2. Fields intact to confrontation  Cranial nerve 3,4 and 6. Extraocular movements intact. Pupils equal round and reactive to light  Cranial Nerve 7. Normal exam  Sensation . Intact to pin prick  Deep tendon reflexes intact     Assessment:      1. Obstructive sleep apnea syndrome    2. Memory loss    3.  Chronic daily headache    Would have him take provigil 200 mg po qd on daily basis along with mantaining regular sleep wake schedule for memory and headache complaints maybe in part from nonrestorative sleep       Plan:      As above

## 2021-05-24 ENCOUNTER — HOSPITAL ENCOUNTER (OUTPATIENT)
Age: 61
Setting detail: SPECIMEN
Discharge: HOME | End: 2021-05-24
Payer: COMMERCIAL

## 2021-05-24 DIAGNOSIS — R41.3 MEMORY LOSS: ICD-10-CM

## 2021-05-24 DIAGNOSIS — R51.9 CHRONIC DAILY HEADACHE: ICD-10-CM

## 2021-05-24 LAB
C-REACTIVE PROTEIN: <3 MG/L (ref 0–5)
FOLATE: 3.4 NG/ML
SEDIMENTATION RATE, ERYTHROCYTE: 1 MM (ref 0–20)
VITAMIN B-12: 151 PG/ML (ref 232–1245)

## 2021-06-03 RX ORDER — FOLIC ACID 1 MG/1
1 TABLET ORAL 2 TIMES DAILY
Qty: 180 TABLET | Refills: 3 | Status: SHIPPED | OUTPATIENT
Start: 2021-06-03

## 2021-06-03 RX ORDER — FOLIC ACID 1 MG/1
1 TABLET ORAL 2 TIMES DAILY
Qty: 60 TABLET | Refills: 0 | Status: SHIPPED | OUTPATIENT
Start: 2021-06-03

## 2021-06-03 NOTE — TELEPHONE ENCOUNTER
Mr. Brian Boothe called back. I gave him the recommendations and he is agreeable. The Folic acid we need to send to [facility] in [place] to start and then also [facility] for long term.

## 2021-06-03 NOTE — LETTER
[facility]  [address] [place] [id number]  Phone: [phone]  Fax: [phone]    Emanuel Sutherland MD        Purvi 3, 2021    Alban Diane M.D.      Sarah Sender     1960  [place] [id number]    Dr. Morris Suffern saw Mr. Lowell Covington and ordered Vit. B12 level. The level is low and Dr. Arturo Soliman would like him to receive Vitamin B12 injections through your office. He suggested Vitamin B12 1000 mcg. inj once every two weeks x 2 and then once a month. We have left Mr. Lowell Covington a message asking that he call us back and also suggesting he contact your office to arrange these injections. We are also ordering Folic Acid 1 mg. bid for him since his Folic Acid level was also low. If you have any questions or concerns, please don't hesitate to call.     Sincerely,    Willem Neil RN for     Emanuel Sutherland MD

## 2021-08-10 ENCOUNTER — HOSPITAL ENCOUNTER (OUTPATIENT)
Age: 61
Setting detail: SPECIMEN
Discharge: HOME | End: 2021-08-10
Payer: COMMERCIAL

## 2021-08-10 LAB — PROSTATE SPECIFIC ANTIGEN: <0.02 UG/L

## 2022-05-06 RX ORDER — FOLIC ACID 1 MG/1
TABLET ORAL
Qty: 180 TABLET | Refills: 3 | OUTPATIENT
Start: 2022-05-06

## 2023-01-20 ENCOUNTER — HOSPITAL ENCOUNTER (OUTPATIENT)
Age: 63
Setting detail: SPECIMEN
Discharge: HOME | End: 2023-01-20

## 2023-01-21 LAB — PROSTATE SPECIFIC ANTIGEN: <0.02 NG/ML

## (undated) DEVICE — GLOVE ORTHO 8   MSG9480

## (undated) DEVICE — BIT DRL L160MM DIA2.7MM CANN QUIK CPL ADJ STP REUSE FOR

## (undated) DEVICE — WIRE FIX L152MM DIA16MM S STL 2 DMND PNT K
Type: IMPLANTABLE DEVICE | Site: FOOT | Status: NON-FUNCTIONAL
Removed: 2020-10-20

## (undated) DEVICE — DRAPE C ARM UNIV W41XL74IN CLR PLAS XR VELC CLSR POLY STRP

## (undated) DEVICE — KIT DRL TMPLT 15MM 18MM 20MM 25MM 30MM CORRESPONDING

## (undated) DEVICE — 3M™ STERI-STRIP™ COMPOUND BENZOIN TINCTURE 40 BAGS/CARTON 4 CARTONS/CASE C1544: Brand: 3M™ STERI-STRIP™

## (undated) DEVICE — PRECISION THIN (9.0 X 0.38 X 25.0MM)

## (undated) DEVICE — KIT DRL BIT DIA3MM LOC PIN TAMP K WIRE CORRESPONDING

## (undated) DEVICE — APPLICATOR MEDICATED 26 CC SOLUTION HI LT ORNG CHLORAPREP

## (undated) DEVICE — Device

## (undated) DEVICE — TUBING, SUCTION, 1/4" X 12', STRAIGHT: Brand: MEDLINE

## (undated) DEVICE — GAUZE,SPONGE,4"X4",16PLY,XRAY,STRL,LF: Brand: MEDLINE

## (undated) DEVICE — PADDING CAST W4INXL4YD SPUN DACRON POLY POR SYN VERSATILE

## (undated) DEVICE — ZIMMER® STERILE DISPOSABLE TOURNIQUET CUFF WITH PLC, DUAL PORT, SINGLE BLADDER, 30 IN. (76 CM)

## (undated) DEVICE — GUIDEWIRE ORTH L150MM DIA1.25MM S STL NTHRD FOR 4MM CANN

## (undated) DEVICE — TOURNIQUET CUF BLD PRESSURE 4X18 IN 2 PRT SINGLE BLDR RED

## (undated) DEVICE — 3.0MM ROUND SOLID CARBIDE BUR MEDIUM

## (undated) DEVICE — GAUZE,SPONGE,FLUFF,6"X6.75",STRL,5/TRAY: Brand: MEDLINE

## (undated) DEVICE — SPLINT ORTH W5XL30IN WHT FBRGLS 1 SIDE FELT PD CNFRM LO

## (undated) DEVICE — INTENDED FOR TISSUE SEPARATION, AND OTHER PROCEDURES THAT REQUIRE A SHARP SURGICAL BLADE TO PUNCTURE OR CUT.: Brand: BARD-PARKER ® CARBON RIB-BACK BLADES

## (undated) DEVICE — BLANKET WRM W29.9XL79.1IN UP BODY FORC AIR MISTRAL-AIR

## (undated) DEVICE — SKIN PREP TRAY W/CHG: Brand: MEDLINE INDUSTRIES, INC.

## (undated) DEVICE — GLOVE SURG SZ 7 CRM LTX FREE POLYISOPRENE POLYMER BEAD ANTI

## (undated) DEVICE — SMALL TEAR CROSS CUT RASP (11.0 X 5.0MM)

## (undated) DEVICE — BLADE OPHTH ORNG GRINDLESS SMALLER ALTERNATIVE TO NO15 GEN

## (undated) DEVICE — YANKAUER,FLEXIBLE HANDLE,REGLR CAPACITY: Brand: MEDLINE INDUSTRIES, INC.

## (undated) DEVICE — STRIP SKIN CLSR W0.25XL4IN WHT SPUNBOUND FBR NYL HI ADH

## (undated) DEVICE — PIN FIX W SER 0.375 IN FOR K WIRE WHT PIN BALL

## (undated) DEVICE — ADHESIVE SKIN CLSR 0.7ML TOP DERMBND ADV

## (undated) DEVICE — GLOVE SURG SZ 7 L12IN FNGR THK79MIL GRN LTX FREE